# Patient Record
Sex: MALE | Race: BLACK OR AFRICAN AMERICAN | NOT HISPANIC OR LATINO | Employment: UNEMPLOYED | URBAN - METROPOLITAN AREA
[De-identification: names, ages, dates, MRNs, and addresses within clinical notes are randomized per-mention and may not be internally consistent; named-entity substitution may affect disease eponyms.]

---

## 2017-02-21 ENCOUNTER — HISTORICAL (OUTPATIENT)
Dept: RADIOLOGY | Facility: HOSPITAL | Age: 42
End: 2017-02-21

## 2017-10-23 ENCOUNTER — HISTORICAL (OUTPATIENT)
Dept: INTERNAL MEDICINE | Facility: CLINIC | Age: 42
End: 2017-10-23

## 2018-01-08 ENCOUNTER — HISTORICAL (OUTPATIENT)
Dept: ADMINISTRATIVE | Facility: HOSPITAL | Age: 43
End: 2018-01-08

## 2018-01-09 LAB — FINAL CULTURE: NORMAL

## 2018-02-01 ENCOUNTER — HISTORICAL (OUTPATIENT)
Dept: INTERNAL MEDICINE | Facility: CLINIC | Age: 43
End: 2018-02-01

## 2018-02-01 LAB
CHOLEST SERPL-MCNC: 186 MG/DL
CHOLEST/HDLC SERPL: 4.5 {RATIO} (ref 0–5)
EST. AVERAGE GLUCOSE BLD GHB EST-MCNC: 126 MG/DL
HBA1C MFR BLD: 6 % (ref 4.2–6.3)
HDLC SERPL-MCNC: 41 MG/DL
LDLC SERPL CALC-MCNC: 129 MG/DL (ref 0–130)
TRIGL SERPL-MCNC: 80 MG/DL
VLDLC SERPL CALC-MCNC: 16 MG/DL

## 2020-10-15 ENCOUNTER — HISTORICAL (OUTPATIENT)
Dept: ADMINISTRATIVE | Facility: HOSPITAL | Age: 45
End: 2020-10-15

## 2020-10-15 LAB
ABS NEUT (OLG): 5.48 X10(3)/MCL (ref 2.1–9.2)
ALBUMIN SERPL-MCNC: 3.9 GM/DL (ref 3.4–5)
ALBUMIN/GLOB SERPL: 0.9 RATIO (ref 1.1–2)
ALP SERPL-CCNC: 72 UNIT/L (ref 45–117)
ALT SERPL-CCNC: 38 UNIT/L (ref 12–78)
APPEARANCE, UA: CLEAR
AST SERPL-CCNC: 28 UNIT/L (ref 15–37)
BACTERIA #/AREA URNS AUTO: ABNORMAL /HPF
BASOPHILS # BLD AUTO: 0.1 X10(3)/MCL (ref 0–0.2)
BASOPHILS NFR BLD AUTO: 1 %
BILIRUB SERPL-MCNC: 0.4 MG/DL (ref 0.2–1)
BILIRUB UR QL STRIP: NEGATIVE
BILIRUBIN DIRECT+TOT PNL SERPL-MCNC: <0.1 MG/DL (ref 0–0.2)
BILIRUBIN DIRECT+TOT PNL SERPL-MCNC: ABNORMAL MG/DL
BUN SERPL-MCNC: 10 MG/DL (ref 7–18)
CALCIUM SERPL-MCNC: 8.9 MG/DL (ref 8.5–10.1)
CHLORIDE SERPL-SCNC: 107 MMOL/L (ref 98–107)
CHOLEST SERPL-MCNC: 182 MG/DL
CHOLEST/HDLC SERPL: 4.1 {RATIO} (ref 0–5)
CO2 SERPL-SCNC: 29 MMOL/L (ref 21–32)
COLOR UR: YELLOW
CREAT SERPL-MCNC: 1 MG/DL (ref 0.6–1.3)
EOSINOPHIL # BLD AUTO: 0.3 X10(3)/MCL (ref 0–0.9)
EOSINOPHIL NFR BLD AUTO: 4 %
ERYTHROCYTE [DISTWIDTH] IN BLOOD BY AUTOMATED COUNT: 14.3 % (ref 11.5–14.5)
GLOBULIN SER-MCNC: 4.2 GM/ML (ref 2.3–3.5)
GLUCOSE (UA): NEGATIVE
GLUCOSE SERPL-MCNC: 102 MG/DL (ref 74–106)
HCT VFR BLD AUTO: 43.6 % (ref 40–51)
HDLC SERPL-MCNC: 44 MG/DL (ref 40–59)
HGB BLD-MCNC: 13.7 GM/DL (ref 13.5–17.5)
HGB UR QL STRIP: NEGATIVE
HYALINE CASTS #/AREA URNS LPF: ABNORMAL /LPF
IMM GRANULOCYTES # BLD AUTO: 0.04 10*3/UL
IMM GRANULOCYTES NFR BLD AUTO: 0 %
KETONES UR QL STRIP: NEGATIVE
LDLC SERPL CALC-MCNC: 116 MG/DL
LEUKOCYTE ESTERASE UR QL STRIP: NEGATIVE
LYMPHOCYTES # BLD AUTO: 2.7 X10(3)/MCL (ref 0.6–4.6)
LYMPHOCYTES NFR BLD AUTO: 30 %
MCH RBC QN AUTO: 26.3 PG (ref 26–34)
MCHC RBC AUTO-ENTMCNC: 31.4 GM/DL (ref 31–37)
MCV RBC AUTO: 83.7 FL (ref 80–100)
MONOCYTES # BLD AUTO: 0.5 X10(3)/MCL (ref 0.1–1.3)
MONOCYTES NFR BLD AUTO: 6 %
NEUTROPHILS # BLD AUTO: 5.48 X10(3)/MCL (ref 2.1–9.2)
NEUTROPHILS NFR BLD AUTO: 60 %
NITRITE UR QL STRIP: NEGATIVE
PH UR STRIP: 6 [PH] (ref 4.5–8)
PLATELET # BLD AUTO: 407 X10(3)/MCL (ref 130–400)
PMV BLD AUTO: 10.3 FL (ref 7.4–10.4)
POTASSIUM SERPL-SCNC: 3.9 MMOL/L (ref 3.5–5.1)
PROT SERPL-MCNC: 8.1 GM/DL (ref 6.4–8.2)
PROT UR QL STRIP: 20 MG/DL
RBC # BLD AUTO: 5.21 X10(6)/MCL (ref 4.5–5.9)
RBC #/AREA URNS AUTO: ABNORMAL /HPF
SODIUM SERPL-SCNC: 143 MMOL/L (ref 136–145)
SP GR UR STRIP: 1.02 (ref 1–1.03)
SQUAMOUS #/AREA URNS LPF: ABNORMAL /LPF
TRIGL SERPL-MCNC: 110 MG/DL
UROBILINOGEN UR STRIP-ACNC: NORMAL
VLDLC SERPL CALC-MCNC: 22 MG/DL
WBC # SPEC AUTO: 9.2 X10(3)/MCL (ref 4.5–11)
WBC #/AREA URNS AUTO: ABNORMAL /HPF

## 2021-01-13 ENCOUNTER — HISTORICAL (OUTPATIENT)
Dept: RADIOLOGY | Facility: HOSPITAL | Age: 46
End: 2021-01-13

## 2021-01-13 LAB
DEPRECATED CALCIDIOL+CALCIFEROL SERPL-MC: 112.1 NG/ML (ref 30–80)
EST. AVERAGE GLUCOSE BLD GHB EST-MCNC: 122.6 MG/DL
HBA1C MFR BLD: 5.9 %
MAGNESIUM SERPL-MCNC: 1.97 MG/DL (ref 1.6–2.6)
PHOSPHATE SERPL-MCNC: 2.9 MG/DL (ref 2.3–4.7)

## 2021-01-20 ENCOUNTER — HISTORICAL (OUTPATIENT)
Dept: RADIOLOGY | Facility: HOSPITAL | Age: 46
End: 2021-01-20

## 2022-04-11 ENCOUNTER — HISTORICAL (OUTPATIENT)
Dept: ADMINISTRATIVE | Facility: HOSPITAL | Age: 47
End: 2022-04-11

## 2022-04-29 VITALS
HEIGHT: 69 IN | WEIGHT: 267.63 LBS | SYSTOLIC BLOOD PRESSURE: 128 MMHG | BODY MASS INDEX: 39.64 KG/M2 | DIASTOLIC BLOOD PRESSURE: 86 MMHG | OXYGEN SATURATION: 98 %

## 2022-08-18 ENCOUNTER — OFFICE VISIT (OUTPATIENT)
Dept: INTERNAL MEDICINE | Facility: CLINIC | Age: 47
End: 2022-08-18

## 2022-08-18 VITALS
TEMPERATURE: 98 F | RESPIRATION RATE: 18 BRPM | DIASTOLIC BLOOD PRESSURE: 69 MMHG | WEIGHT: 268 LBS | BODY MASS INDEX: 40.62 KG/M2 | OXYGEN SATURATION: 97 % | SYSTOLIC BLOOD PRESSURE: 108 MMHG | HEIGHT: 68 IN | HEART RATE: 74 BPM

## 2022-08-18 DIAGNOSIS — B35.1 FUNGAL TOENAIL INFECTION: ICD-10-CM

## 2022-08-18 DIAGNOSIS — Z86.73 HISTORY OF CEREBROVASCULAR ACCIDENT (CVA) GREATER THAN EIGHT WEEKS IN THE PAST: ICD-10-CM

## 2022-08-18 DIAGNOSIS — D50.9 IRON DEFICIENCY ANEMIA, UNSPECIFIED IRON DEFICIENCY ANEMIA TYPE: ICD-10-CM

## 2022-08-18 DIAGNOSIS — I10 PRIMARY HYPERTENSION: Primary | ICD-10-CM

## 2022-08-18 DIAGNOSIS — G23.1 PSP (PROGRESSIVE SUPRANUCLEAR PALSY): ICD-10-CM

## 2022-08-18 DIAGNOSIS — Z12.12 SCREENING FOR MALIGNANT NEOPLASM OF THE RECTUM: ICD-10-CM

## 2022-08-18 PROCEDURE — 99215 OFFICE O/P EST HI 40 MIN: CPT | Mod: PBBFAC

## 2022-08-18 RX ORDER — PANTOPRAZOLE SODIUM 40 MG/1
40 TABLET, DELAYED RELEASE ORAL DAILY
Qty: 30 TABLET | Refills: 2 | Status: SHIPPED | OUTPATIENT
Start: 2022-08-18 | End: 2023-06-20 | Stop reason: SDUPTHER

## 2022-08-18 RX ORDER — ALBUTEROL SULFATE 0.63 MG/3ML
0.63 SOLUTION RESPIRATORY (INHALATION)
COMMUNITY
Start: 2021-12-09 | End: 2024-03-27

## 2022-08-18 RX ORDER — ROSUVASTATIN CALCIUM 40 MG/1
40 TABLET, COATED ORAL
COMMUNITY
Start: 2021-12-09 | End: 2022-08-18 | Stop reason: ALTCHOICE

## 2022-08-18 RX ORDER — ATORVASTATIN CALCIUM 80 MG/1
80 TABLET, FILM COATED ORAL DAILY
COMMUNITY
Start: 2022-06-03 | End: 2022-08-18 | Stop reason: SDUPTHER

## 2022-08-18 RX ORDER — METOPROLOL TARTRATE 25 MG/1
25 TABLET, FILM COATED ORAL 2 TIMES DAILY
Qty: 30 TABLET | Refills: 2 | Status: SHIPPED | OUTPATIENT
Start: 2022-08-18 | End: 2023-04-03 | Stop reason: SDUPTHER

## 2022-08-18 RX ORDER — GABAPENTIN 600 MG/1
600 TABLET ORAL 3 TIMES DAILY
COMMUNITY
Start: 2022-04-25 | End: 2023-07-17

## 2022-08-18 RX ORDER — AMLODIPINE BESYLATE 2.5 MG/1
2.5 TABLET ORAL DAILY
Qty: 30 TABLET | Refills: 2 | Status: SHIPPED | OUTPATIENT
Start: 2022-08-18 | End: 2023-04-03 | Stop reason: SDUPTHER

## 2022-08-18 RX ORDER — PANTOPRAZOLE SODIUM 40 MG/1
40 TABLET, DELAYED RELEASE ORAL DAILY
Qty: 30 TABLET | Refills: 2 | Status: SHIPPED | OUTPATIENT
Start: 2022-08-18 | End: 2022-08-18 | Stop reason: SDUPTHER

## 2022-08-18 RX ORDER — PAROXETINE 30 MG/1
30 TABLET, FILM COATED ORAL
COMMUNITY
Start: 2021-07-22 | End: 2022-08-18 | Stop reason: SDUPTHER

## 2022-08-18 RX ORDER — ATORVASTATIN CALCIUM 80 MG/1
80 TABLET, FILM COATED ORAL DAILY
Qty: 30 TABLET | Refills: 2 | Status: SHIPPED | OUTPATIENT
Start: 2022-08-18 | End: 2022-12-06 | Stop reason: ALTCHOICE

## 2022-08-18 RX ORDER — PAROXETINE 30 MG/1
30 TABLET, FILM COATED ORAL DAILY
Qty: 30 TABLET | Refills: 2 | Status: SHIPPED | OUTPATIENT
Start: 2022-08-18 | End: 2023-10-08 | Stop reason: SDUPTHER

## 2022-08-18 RX ORDER — METOPROLOL TARTRATE 25 MG/1
25 TABLET, FILM COATED ORAL 2 TIMES DAILY
COMMUNITY
Start: 2022-04-25 | End: 2022-08-18 | Stop reason: SDUPTHER

## 2022-08-18 RX ORDER — NITROGLYCERIN 0.4 MG/1
0.4 TABLET SUBLINGUAL
COMMUNITY
Start: 2021-09-16 | End: 2023-04-03 | Stop reason: SDUPTHER

## 2022-08-18 RX ORDER — PANTOPRAZOLE SODIUM 40 MG/1
40 TABLET, DELAYED RELEASE ORAL DAILY
COMMUNITY
Start: 2022-04-25 | End: 2022-08-18 | Stop reason: SDUPTHER

## 2022-08-18 RX ORDER — AMLODIPINE BESYLATE 2.5 MG/1
2.5 TABLET ORAL DAILY
COMMUNITY
Start: 2022-04-25 | End: 2022-08-18 | Stop reason: SDUPTHER

## 2022-08-18 NOTE — PROGRESS NOTES
[facility]  CLINIC NOTE    Patient Name: John Jackson  YOB: 1975    PRESENTING HISTORY       History of Present Illness:  Mr. John Jackson is a 47 y.o. male  With PMHx: HTN, HLD, CVA (2015 w/ L sided weakness), PFO, chronic low back and neck pain, hx of gastric ulcer    This is a 46 y.o AAM who presents to clinic as a routine f/u. Denies any fever, chills, chest pain, palpitations, nausea, vomiting, abdominal pain, lower extremity swelling, weakness, dizziness, syncope  Patient underwent C5-C7 anterior spine fusion by Dr. Aj on 3/17/21 and was scheduled to have L3-L4 fusion with Dr. Aj on 08/16/2021, but had to cancel due to his wife needing surgery. States that they are planning to reschedule around the first of next year.    He continues to have hx of intermitted low back and neck pain. Still reports gait instability and pain that radiates down his left leg, chronic unchanged. Denies any falls. Patient states he was told per neurosurgery that they would wait to see how he recovers from cervical spine surgery (was told it would be slow healing process) and see if back pain improves any.    Patient was recently seen by cardiology on 3/11/2021 for follow up of PFO.  CHRIS September 18, 2015: No valvular insufficiencies. Patent foramen ovale present with right to left shunt. Lexiscan August 18, 2015: Low risk study, Normal myocardial perfusion study. Negative for ischemia. DSE was performed on 2/2017 which was negative for ischemia. Per last cardiology visit note, plan was to order 30-day Cardiac Event Monitor given intermittent nature of palpitations in the setting of prior CVAs, which was to be done after his neck surgery. Cardiology also discussed potential PFO closure with the pt and plans to discuss further at next visit depending on results of event monitor. Patient reports he received cardiac monitor but was not instructed with how to use or when to start.  Instructed patient to call cardiology office AGAIN today for further instruction.    Depression stable on current dose. No side effects.    At present, he denies chest pain at rest, sob, LOC, n/v/abd pain, melanotic stools or presence of blood in urine, stool, sputum. No fevers or chills. Does endorse chronic chest pain with exertion.     Last Visit: Reports difficulty swallowing solids, no difficulty swallowing liquids. He does endorse intermittent symptoms of pain with swallowing and states the he feels the sensation of food getting stuck in his throat but never vomits. He has also lost 6 ibs since Sept 2021 until now. He reports these symptoms all started a little before August 2021 but this is the first time patient has ever mentioned anything to me. Appetite has lessened some. He denies any abdominal pain, night sweats, fever/chills. He is taking omeprazole 20mg daily which has been on chronically. I ordered lab work for today's visit which he had done in [place] but does not have records sent here yet. Will try to obtain.     Today: He is complaining about a foot drainage in his nail area. See picture in chart. He has continued having having difficulty swallowing. He has continued weakness, balance issues, double vision, pain in neck and pain. He has chronic headaches as well that he used to take amitriptyline.     Suspect PSP, (+) RBD, Mood disorders, Balance issues, Diplopia      CURRENT MEDICATIONS      Current Outpatient Medications on File Prior to Visit   Medication Sig    albuterol (ACCUNEB) 0.63 mg/3 mL Nebu Inhale 0.63 mg into the lungs.    amLODIPine (NORVASC) 2.5 MG tablet Take 2.5 mg by mouth once daily.    gabapentin (NEURONTIN) 600 MG tablet Take 600 mg by mouth 3 (three) times daily.    metoprolol tartrate (LOPRESSOR) 25 MG tablet Take 25 mg by mouth 2 (two) times daily.    nitroGLYCERIN (NITROSTAT) 0.4 MG SL tablet Place 0.4 mg under the tongue.    paroxetine (PAXIL) 30 MG tablet Take  30 mg by mouth.    [DISCONTINUED] atorvastatin (LIPITOR) 80 MG tablet Take 80 mg by mouth once daily.    [DISCONTINUED] pantoprazole (PROTONIX) 40 MG tablet Take 40 mg by mouth once daily.    rosuvastatin (CRESTOR) 40 MG Tab Take 40 mg by mouth.     No current facility-administered medications on file prior to visit.         Review of Systems   HENT: Negative for hearing loss.    Eyes: Negative for discharge.   Respiratory: Negative for wheezing.    Cardiovascular: Negative for chest pain and palpitations.   Gastrointestinal: Positive for constipation. Negative for blood in stool, diarrhea and vomiting.   Genitourinary: Positive for urgency. Negative for hematuria.   Musculoskeletal: Positive for neck pain.   Neurological: Positive for weakness and headaches.   Endo/Heme/Allergies: Positive for polydipsia.       PAST HISTORY:     Past Medical History:   Diagnosis Date    Diabetes mellitus, type 2     Hyperlipidemia     Hypertension        Past Surgical History:   Procedure Laterality Date    CERVICAL FUSION      [facility] 2021    FUSION OF CERVICAL SPINE BY POSTERIOR APPROACH  2017    [facility]       Family History   Problem Relation Age of Onset    No Known Problems Mother     No Known Problems Father        Social History     Socioeconomic History    Marital status: Single   Occupational History    Occupation: dissabled   Tobacco Use    Smoking status: Never Smoker    Smokeless tobacco: Never Used   Substance and Sexual Activity    Alcohol use: Yes     Alcohol/week: 1.0 standard drink     Types: 1 Glasses of wine per week     Comment: glass per night (sm)    Drug use: Yes     Types: Marijuana       Review of patient's allergies indicates:   Allergen Reactions    Aspirin Shortness Of Breath    Iodine Nausea And Vomiting     Seafood.    Shellfish containing products Shortness Of Breath       OBJECTIVE:   Vital Signs:  Vitals:    08/18/22 1444   BP: 108/69   Pulse: 74   Resp: 18   Temp: 98.4  "°F (36.9 °C)   TempSrc: Oral   SpO2: 97%   Weight: 121.6 kg (268 lb)   Height: 5' 8" (1.727 m)       No results found for this or any previous visit (from the past 24 hour(s)).      Physical Exam  Constitutional:       Appearance: He is obese.   HENT:      Head: Normocephalic.   Eyes:      General: Visual field deficit present.      Extraocular Movements: Extraocular movements intact.      Pupils: Pupils are equal, round, and reactive to light.   Cardiovascular:      Rate and Rhythm: Normal rate and regular rhythm.      Pulses: Normal pulses.      Heart sounds: Normal heart sounds.   Pulmonary:      Effort: Pulmonary effort is normal.      Breath sounds: Normal breath sounds.   Musculoskeletal:      Cervical back: Rigidity and tenderness present.   Neurological:      Mental Status: He is alert.      Cranial Nerves: Cranial nerve deficit present.      Motor: Weakness and abnormal muscle tone present. No tremor.      Coordination: Romberg sign positive. Finger-Nose-Finger Test abnormal.      Comments: Movement Exam:    Hypophonic, Hypomimia  No postural or rest tremors  SQJs (+), Vertical Pendular Oscillations, Delay in downward Gaze  FTN without tremor, difficulty performing with diplopia   Hypokinesia in Bilateral Upper extremities on finger taps, decrement more apparent on right   Increased tone, not necessarily cogwheeling  (+) Pull Test   (+) Rombergs         Laboratory  CMP:   Recent Labs   Lab 10/15/20  1026 03/15/21  0907   Sodium Level 143 142   Potassium Level 3.9 4.5   Chloride 107 107   Carbon Dioxide 29 27   Blood Urea Nitrogen 10 7.3 L   Creatinine 1.00 0.90   Glucose Level 102 102 H   Calcium Level Total 8.9 8.7   Albumin Level 3.9  --    Bilirubin Total 0.4  --    Bilirubin Direct <0.1  --    Bilirubin Indirect Calc. not valid  --    Aspartate Aminotransferase 28  --    Alanine Aminotransferase 38  --    Alkaline Phosphatase 72  --      CBC:   Recent Labs   Lab 10/15/20  1026 03/15/21  0907   WBC 9.2  " --    Neut # 5.48  --    RBC 5.21  --    Hgb 13.7 13.7 L   Hct 43.6 44.5   Platelet 407 H  --    MCV 83.7  --    RDW 14.3  --      FLP:   Recent Labs   Lab 10/15/20  1026   Cholesterol Total 182   HDL Cholesterol 44   LDL Cholesterol 116   Triglyceride 110     DM:   Recent Labs   Lab 10/15/20  1026 01/13/21  1007 03/15/21  0907   Hemoglobin A1c  --  5.9  --    Creatinine 1.00  --  0.90     Thyroid:       Invalid input(s): [id number]  Anemia:   Recent Labs   Lab 03/15/21  0907   Hgb 13.7 L   Hct 44.5       ASSESSMENT & PLAN:         Health Maintenance Due   Topic Date Due    Hepatitis C Screening  Never done    COVID-19 Vaccine (1) Never done    HIV Screening  Never done    TETANUS VACCINE  Never done    Colorectal Cancer Screening  Never done      Cervical spine stenosis s/p C5-7 Cervical Fusion Anterior on 3/17/21  - MRI lumbar spine in April 2015 showed diffuse spondylitic changes with mild diffuse narrowing of neural foramina, multilevel disc bulging  - Status post neck surgery in November 2015 at [facility]  - Most recently underwent C5-C7 spine fusion by Dr. Aj, scheduled to undergo L3-L4 fusion with Dr. Aj on 08/16/2021 which was cancelled and being rescheduled for next year  - Currently Baclofen prescribed per neurosurgery  - Refilled gabapentin today for neuropathy    Hx of PFO/ chronic chest pain on exertion  - PFO with right to left shunt seen on CHRIS in Sept 2015  - DSE was performed on 2/2017 which was negative for ischemia  - 48-Hour Holter Monitor (1/13/21): No significant arrhythmias found  - TTE (1/13/21): EF 55%  - DSE (1/13/21): Negative for ischemia  - Followed by [facility] cardiology last seen on 03/2022, planning to discuss PFO closure at his next follow up visit pending results of Holter monitor  - Given prn nitroglycerin per cardiology - states he has not required use, has followed up with Cardiology 8/31/2022    Dysphagia -> Suspected PSP  - RBD, Balance issues, Pull test (+),  Hypophonia, Square wave jerks, Vertical Pendular Oscillations (slow)  -Referral to Neurology  -see HPI above  -ordered Barium swallow test at last visit - pt given number to call to schedule testing  -continue protonix 40mg daily    History of CVA with residual left-sided weakness  - Stable, no new deficits; uses walker and/or cane at home for ambulation.  - Continue aspirin 81mg daily  - LDL increased to now 150, will stop rosuvastatin and start atorvastatin 80mg daily  - repeat FLP at next visit    Pre-diabetes  - A1C is 5.7% in 2020 increased to 5.9% in 2021 --> decreased now to 5.6%  - will obtain A1c today, if not increased significantly will stop metformin    Essential HTN, well-controlled  - BP today at goal  - Continue current medications Norvasc 2.5mg daily and metoprolol 25mg BID  - Low-sodium diet and exercise discussed    Depression, stable  - will continue home med paroxetine 30 mg daily    Painful skin lesions/ onychomycosis  s/p skin tag removal  - pt reports painful horn on left temple, irritating skin tags on face and painful keloid overlying C-spine incision scar  - thickened b/l great toenails present for past year with underlying debris, can consider PO treatment in future will hold on on starting new medication before he undergoes back surgery  - seen by Derm clinic for skin tag removal in 2021  - Recommended [employer] medicated powder in the interim.    Asthma  -report hx of childhood asthma  -reports no recent exacerbations  -continues to use albuterol inhaler for symptoms of LEW on daily basis  -reorder PFTs at last visit as patient did not have done before today and order          Health Maintenance:  TDAP: 10/15/2020  CAGE screenin/4  HIV screening: ordered at last visit  Acute hep panel and syphilis Ab screening ordered at last visit  Influenza vaccine: flu given today  Tdap: given 10/15/2020  colon cancer screening: Will order FIT test  AAA - never smoker  COVID -  UTD  PCV 23 - Given 5/2022      Future Appointments     Future Appointments   Date Time Provider Department Center   8/31/2022  8:30 AM Althea Nichole MD [facility]IK[facility] Jaskaran [facility]      Orders Placed This Encounter   Procedures    OCCULT BLOOD FECAL IMMUNOASSAY     Standing Status:   Future     Number of Occurrences:   1     Standing Expiration Date:   10/17/2023    Ambulatory referral/consult to Podiatry     Standing Status:   Future     Standing Expiration Date:   9/18/2023     Referral Priority:   Routine     Referral Type:   Consultation     Referral Reason:   Specialty Services Required     Referred to Provider:   Narcisa Mckenzie DPM     Requested Specialty:   Podiatry     Number of Visits Requested:   1         Discussed with Dr. Rowley  - Staff Attestation to Follow    Tu Ramirez MD  [facility] Internal [facility] PGY-1

## 2022-08-24 ENCOUNTER — TELEPHONE (OUTPATIENT)
Dept: INTERNAL MEDICINE | Facility: CLINIC | Age: 47
End: 2022-08-24

## 2022-08-24 NOTE — TELEPHONE ENCOUNTER
----- Message from Tu Ramirez MD sent at 8/22/2022  8:30 AM CDT -----  Please inform Mr. Jackson, the following labs were unremarkable.

## 2022-08-31 ENCOUNTER — TELEPHONE (OUTPATIENT)
Dept: INTERNAL MEDICINE | Facility: CLINIC | Age: 47
End: 2022-08-31

## 2022-08-31 ENCOUNTER — OFFICE VISIT (OUTPATIENT)
Dept: CARDIOLOGY | Facility: CLINIC | Age: 47
End: 2022-08-31

## 2022-08-31 VITALS
OXYGEN SATURATION: 96 % | SYSTOLIC BLOOD PRESSURE: 113 MMHG | HEIGHT: 69 IN | DIASTOLIC BLOOD PRESSURE: 73 MMHG | TEMPERATURE: 99 F | HEART RATE: 64 BPM | BODY MASS INDEX: 39.34 KG/M2 | WEIGHT: 265.63 LBS | RESPIRATION RATE: 24 BRPM

## 2022-08-31 DIAGNOSIS — J45.21 MILD INTERMITTENT ASTHMA WITH ACUTE EXACERBATION: Primary | ICD-10-CM

## 2022-08-31 DIAGNOSIS — R00.2 PALPITATIONS: Primary | ICD-10-CM

## 2022-08-31 DIAGNOSIS — E78.5 HYPERLIPIDEMIA LDL GOAL <70: ICD-10-CM

## 2022-08-31 DIAGNOSIS — I10 HYPERTENSION, UNSPECIFIED TYPE: ICD-10-CM

## 2022-08-31 PROCEDURE — 99214 OFFICE O/P EST MOD 30 MIN: CPT | Mod: PBBFAC | Performed by: INTERNAL MEDICINE

## 2022-08-31 RX ORDER — ASPIRIN 81 MG/1
81 TABLET ORAL DAILY
Refills: 0
Start: 2022-08-31 | End: 2023-02-10 | Stop reason: SDUPTHER

## 2022-08-31 RX ORDER — EZETIMIBE 10 MG/1
10 TABLET ORAL DAILY
Qty: 90 TABLET | Refills: 3 | Status: SHIPPED | OUTPATIENT
Start: 2022-08-31 | End: 2023-04-03 | Stop reason: SDUPTHER

## 2022-08-31 RX ORDER — ALBUTEROL SULFATE 90 UG/1
2 AEROSOL, METERED RESPIRATORY (INHALATION) EVERY 6 HOURS PRN
Qty: 0.103 G | Refills: 3 | Status: SHIPPED | OUTPATIENT
Start: 2022-08-31 | End: 2023-06-20 | Stop reason: SDUPTHER

## 2022-08-31 RX ORDER — BUDESONIDE AND FORMOTEROL FUMARATE DIHYDRATE 160; 4.5 UG/1; UG/1
2 AEROSOL RESPIRATORY (INHALATION) EVERY 12 HOURS
Qty: 10.2 G | Refills: 1 | Status: SHIPPED | OUTPATIENT
Start: 2022-08-31 | End: 2023-06-20 | Stop reason: SDUPTHER

## 2022-08-31 NOTE — TELEPHONE ENCOUNTER
Patient requesting rx. For an Albuterol inhaler for Asthma  diagnosis. Patient states he uses it as a rescue inhaler. Please review and advise

## 2022-08-31 NOTE — TELEPHONE ENCOUNTER
Spoke with patient.    Will prescribe symbicort at this time, as well as renew his controller.    Tu Ramirez MD  [facility] Internal Medicine PGY-1

## 2022-08-31 NOTE — PROGRESS NOTES
Chief Complaint   Patient presents with    f/u visit, denies chest pains, c/o sob on exertion    Palpitations    Dizziness       HPI    47 year old male with HTN, HLD, diet controlled DM, ?CVA x 2 (no brain MRI at the time), PFO, asthma presents for follow-up and ongoing care. He reported having a coronary angiogram with Dr. Nate Mae in the past in which he was told he did not have any significant blockage. Patient completed a 48-hour Holter monitor in January 2021 in which no significant arrhythmias were found. He completed an echocardiogram in January 2021 which revealed an ejection fraction of 55%. Dobutamine stress echocardiogram completed in January 2021 was negative for ischemia.     Today, pt continues to complain of palpitations that occur at random.  They usually last  5- 10 minutes. Palpitations are a/w SOB and dizziness. Sometimes experiences sharp shooting chest pain that lasts for seconds.  Denies any other exertional chest pain.  He complains chronic dyspnea due to his asthma. He did undergo neck surgery in March 2021 and states he will require lower back surgery in the near future.  He is currently only on rescue inhaler that he has been taking very frequently.  No bleeding on aspirin.      48-Hour Holter Monitor (1/13/21): No significant arrhythmias found  TTE (1/13/21): EF 55%  DSE (1/13/21): Negative for ischemia  CHRIS (9/18/15): PFO w/ Rt to Lt shunt       Review of Systems  Constitutional: negative for fever,chills, sweats, weakness, fatigue, decreased activity   Eye: negative for blurry vision, vision change  ENMT: negative for sore throat, nasal congestion  Respiratory: negative for shortness of breath, cough, wheezing  Cardiovascular: negative for chest pain, dyspnea on exertion, orthopnea, PND, lower extremity edema, palpitations, claudication  Gastrointestinal: negative for nausea, vomiting, abdominal pain, constipation, diarrhea, blood in stool  Genitourinary: negative for hematuria,  nocturia, dysuria  Endocrine: negative for abnormal thirst, temperature  Musculoskeletal: negative for back pain, joint pain  Integumentary: negative for abnormal mole  Neurologic: negative for weakness, numbness, headaches, shooting pain  Hematology: negative for easy bruising, easy bleeding  Allergy: negative for watery eyes, sneezing  Psychiatric: negative for loss of interest, anxiety, stress      Physical Exam Vitals & Measurements     Vitals:    08/31/22 0847   BP: 113/73   Pulse: 64   Resp: (!) 24   Temp: 98.6 °F (37 °C)       General: alert and oriented/no acute distress  Eye: EOMI/normal conjunctiva  HENT: normocephalic/moist oral mucosa  Neck: supple/nontender/no carotid bruit/no JVD  Respiratory: lungs CTA/nonlabored respirations/BS equal/symmetrical expansion/no chest wall tenderness  Cardiovascular: normal rate/normal rhythm/no murmur/normal peripheral perfusion/no edema  Gastrointestinal: soft/nontender  Musculoskeletal: normal ROM  Integumentary: warm/dry/pink/intact  Neurologic: alert/oriented/normal sensory/no focal deficits  Psychiatric: cooperative/appropriate mood and affect/normal judgment       Current Outpatient Medications:     albuterol (ACCUNEB) 0.63 mg/3 mL Nebu, Inhale 0.63 mg into the lungs., Disp: , Rfl:     amLODIPine (NORVASC) 2.5 MG tablet, Take 1 tablet (2.5 mg total) by mouth once daily., Disp: 30 tablet, Rfl: 2    atorvastatin (LIPITOR) 80 MG tablet, Take 1 tablet (80 mg total) by mouth once daily., Disp: 30 tablet, Rfl: 2    gabapentin (NEURONTIN) 600 MG tablet, Take 600 mg by mouth 3 (three) times daily., Disp: , Rfl:     metoprolol tartrate (LOPRESSOR) 25 MG tablet, Take 1 tablet (25 mg total) by mouth 2 (two) times daily., Disp: 30 tablet, Rfl: 2    nitroGLYCERIN (NITROSTAT) 0.4 MG SL tablet, Place 0.4 mg under the tongue., Disp: , Rfl:     pantoprazole (PROTONIX) 40 MG tablet, Take 1 tablet (40 mg total) by mouth once daily., Disp: 30 tablet, Rfl: 2    paroxetine (PAXIL) 30  MG tablet, Take 1 tablet (30 mg total) by mouth once daily., Disp: 30 tablet, Rfl: 2        Assessment/Plan    Palpitations - continues to endorse palpitations at random - will reorder 30 day monitor     HTN - BP at goal - 113/73    HLD - lipid panel not at goal, LDL 94. Will add zetia 10mg daily     Possible hx of CVA x2, no brain MRI done at the time of the stroke. Pt found to have PFO on CHRIS in 2015. Pt will need to be seen my neurology for possible brain MRI to determine if neurologic symptoms were consistent with stroke. If there is evidence of prior stroke, pt will benefit from referral for PFO closure.  continue Aspirin, statin    DM -diet controlled. A1c 5.6%    Asthma  Currently only on albuterol PRN which he has been using frequently  Will reach out to PCP to request addition of maintenance inhaler.     30 day monitor  Zetia 10mg daily  3 month F/U

## 2022-09-07 ENCOUNTER — HOSPITAL ENCOUNTER (OUTPATIENT)
Dept: CARDIOLOGY | Facility: HOSPITAL | Age: 47
Discharge: HOME | End: 2022-09-07
Attending: INTERNAL MEDICINE
Payer: MEDICAID

## 2022-09-07 DIAGNOSIS — R00.2 PALPITATIONS: ICD-10-CM

## 2022-09-07 PROCEDURE — 93270 REMOTE 30 DAY ECG REV/REPORT: CPT

## 2022-11-03 NOTE — PROGRESS NOTES
[facility] Neurology Initial Office Visit Note    Initial Visit Date: 11/4/2022  Current Visit Date:  11/04/2022    Chief Complaint:     Chief Complaint   Patient presents with    News PAtient referral-complains of weakness both upper nadl       History of Present Illness:      This is 47 y.o. male with history of hypertension, hyperlipidemia, CAD, anxiety, depression, C3-4 cervical myelopathy status post ACDF, who is referred for gait instability. He states that he continued to have paresthesia and weakness in bilateral upper and lower extremities prior to his ACDF, but he has not gotten better since then. He states that his paresthesia has progressed since then.  Also reports involuntary in closure of left hand and big toe going upwards.  Also stated that he had trouble with urination. He had not gotten a repeat MRI ordered by NSGY this year. + Neck pain. Had not had PT/OT for > 1 year.  Also states that his vision will be blurry.  States that he has trouble seeing side to side.  Has not actually seen an eye doctor.      Medications:     Current Outpatient Medications on File Prior to Visit   Medication Sig Dispense Refill    albuterol (ACCUNEB) 0.63 mg/3 mL Nebu Inhale 0.63 mg into the lungs.      albuterol (VENTOLIN HFA) 90 mcg/actuation inhaler Inhale 2 puffs into the lungs every 6 (six) hours as needed for Wheezing. Rescue 0.103 g 3    amLODIPine (NORVASC) 2.5 MG tablet Take 1 tablet (2.5 mg total) by mouth once daily. 30 tablet 2    aspirin (ECOTRIN) 81 MG EC tablet Take 1 tablet (81 mg total) by mouth once daily.  0    atorvastatin (LIPITOR) 80 MG tablet Take 1 tablet (80 mg total) by mouth once daily. 30 tablet 2    budesonide-formoterol 160-4.5 mcg (SYMBICORT) 160-4.5 mcg/actuation HFAA Inhale 2 puffs into the lungs every 12 (twelve) hours. Controller 10.2 g 1    ezetimibe (ZETIA) 10 mg tablet Take 1 tablet (10 mg total) by mouth once daily. 90 tablet 3    gabapentin (NEURONTIN) 600 MG tablet Take 600 mg by  mouth 3 (three) times daily.      metoprolol tartrate (LOPRESSOR) 25 MG tablet Take 1 tablet (25 mg total) by mouth 2 (two) times daily. 30 tablet 2    nitroGLYCERIN (NITROSTAT) 0.4 MG SL tablet Place 0.4 mg under the tongue.      pantoprazole (PROTONIX) 40 MG tablet Take 1 tablet (40 mg total) by mouth once daily. 30 tablet 2    paroxetine (PAXIL) 30 MG tablet Take 1 tablet (30 mg total) by mouth once daily. 30 tablet 2     No current facility-administered medications on file prior to visit.       Labs:     Results for orders placed or performed in visit on 08/18/22   Hemoglobin A1C   Result Value Ref Range    Hemoglobin A1c 5.6 <=7.0 %    Estimated Average Glucose 114.0 mg/dL   Lipid Panel   Result Value Ref Range    Cholesterol Total 161 <=200 mg/dL    HDL Cholesterol 41 35 - 60 mg/dL    Triglyceride 129 34 - 140 mg/dL    Cholesterol/HDL Ratio 4 0 - 5    Very Low Density Lipoprotein 26     LDL Cholesterol 94.00 50.00 - 140.00 mg/dL   Comprehensive Metabolic Panel   Result Value Ref Range    Sodium Level 140 136 - 145 mmol/L    Potassium Level 4.3 3.5 - 5.1 mmol/L    Chloride 103 98 - 107 mmol/L    Carbon Dioxide 29 22 - 29 mmol/L    Glucose Level 87 74 - 100 mg/dL    Blood Urea Nitrogen 12.9 8.9 - 20.6 mg/dL    Creatinine 1.19 (H) 0.73 - 1.18 mg/dL    Calcium Level Total 9.6 8.4 - 10.2 mg/dL    Protein Total 8.1 6.4 - 8.3 gm/dL    Albumin Level 4.1 3.5 - 5.0 gm/dL    Globulin 4.0 (H) 2.4 - 3.5 gm/dL    Albumin/Globulin Ratio 1.0 (L) 1.1 - 2.0 ratio    Bilirubin Total 0.5 <=1.5 mg/dL    Alkaline Phosphatase 73 40 - 150 unit/L    Alanine Aminotransferase 30 0 - 55 unit/L    Aspartate Aminotransferase 25 5 - 34 unit/L    eGFR >60 mls/min/1.73/m2   HIV 1/2 Ag/Ab (4th Gen)   Result Value Ref Range    HIV Nonreactive Nonreactive   Hepatitis Panel, Acute   Result Value Ref Range    Hepatitis A IgM Nonreactive Nonreactive    Hepatitis B Core IgM Nonreactive Nonreactive    Hepatitis B Surface Antigen Nonreactive  Nonreactive    Hepatitis C Antibody Nonreactive Nonreactive   Ferritin   Result Value Ref Range    Ferritin Level 242.61 21.81 - 274.66 ng/mL   Iron and TIBC   Result Value Ref Range    Iron Binding Capacity Unsaturated 229 69 - 240 ug/dL    Iron Level 75 65 - 175 ug/dL    Transferrin 267 174 - 364 mg/dL    Iron Binding Capacity Total 304 250 - 450 ug/dL    Iron Saturation 25 20 - 50 %   Pathologist Interpretation   Result Value Ref Range    Pathology Review       No serological evidence of recent or past hepatitis A, B, or C infection.  Jean Carlos Huggins MD         Studies:      MRI C spine w/o Fortino 1/20/2021:  I have reviewed the study independently and with the patient.  Hardware artifact.  Cord atrophy noted at C3 to C5 consistent with prior cord compression.    Review of Systems:     Review of Systems   All other systems reviewed and are negative.    Physical Exams:     Vitals:    11/04/22 0918   BP: 116/79   Pulse: 90   Resp: 14   Temp: 98 °F (36.7 °C)       Physical Exam  Vitals and nursing note reviewed.   Constitutional:       Appearance: Normal appearance.   HENT:      Head: Normocephalic and atraumatic.      Nose: Nose normal.      Mouth/Throat:      Mouth: Mucous membranes are moist.      Pharynx: Oropharynx is clear.   Eyes:      Conjunctiva/sclera: Conjunctivae normal.   Cardiovascular:      Rate and Rhythm: Normal rate and regular rhythm.      Pulses: Normal pulses.      Heart sounds: Normal heart sounds.   Pulmonary:      Effort: Pulmonary effort is normal.      Breath sounds: Normal breath sounds.   Abdominal:      General: Abdomen is flat.      Palpations: Abdomen is soft.   Musculoskeletal:      Cervical back: Normal range of motion.      Comments: Limited passive and active range of motion in neck   Neurological:      Mental Status: He is alert.   Psychiatric:         Mood and Affect: Mood normal.       Comprehensive Neurological Exam:  Mental Status: Alert Oriented to Self, Date, and Place.  Comprehension wnl. No dysarthria.   CN II - XII: SHAWN, No APD, Fundus wnl OU, VFFC, No ptosis OU, EOMI without nystagmus on observation, poor VOR in all directions due to limited range of motion in neck,  vertical and horizontal saccades normal on OKN strip, LT/Temp symmetric in CN V1-3 distribution, Hearing grossly intact, Face Symmetric, Tongue and Uvula midline, Trapezius symmetric bilateral.   Motor: tone increased in right upper and bilateral lower extremities, and bulk wnl throughout, no abnormal involuntary or voluntary movements, 5/5 to confrontation except for 4/5 in left deltoid, left triceps, left intrinsic hand, left thumb extension, left iliopsoas, left EHL, left TA, left peroneal longus, 4+/5 left tibialis posterior, Fine finger movements wnl b/l, left downward pronator drift.   Sensory: LT, Proprioception, Vibration, PP, Temp decreased in left upper and left lower  Reflexes: 3+ throughout except for left brachioradialis, left biceps, + left finger flexion and grasp,  tight heel cords bilaterally, upgoing toes bilateral   Cerebellar: FNF wnl b/l, RAHM wnl b/l  Romberg:  Negative  Gait:  Bilateral dystonic big toe dorsiflexion.    Assessment:     This is 47 y.o. male with history of hypertension, hyperlipidemia, CAD, anxiety, depression, diabetes, C3-4 cervical myelopathy status post ACDF, who is referred for cervical myelopathy with radiculopathy with residual dystonia and carpopedal spasm.  Eye exam not up-to-date.  No evidence to suggest eye motility issues.  May consider lowering gabapentinoids and avoid concurrent use of gabapentinoids and SSRI given subjective complaint of delay reflexes.    Problem List Items Addressed This Visit          Neuro    Cervical myelopathy with cervical radiculopathy - Primary       Plan:     [] Continue follow-up with neurosurgery  [] Patient highly advised to follow-up with ophthalmologist or optometry for up-to-date eye exam given history of diabetes and  hypertension.    RTC p.r.n.    I have explained the treatment plan, diagnosis, and prognosis to patient. All questions are answered to the best of my knowledge.     Face to face time 60 minutes, including documentation, chart review, counseling, education, review of test results, relevant medical records, and coordination of care.   I have explained the treatment plan, diagnosis, and prognosis to patient. All questions are answered to the best of my knowledge.       Carlota Kan MD   General Neurology  11/04/2022

## 2022-11-04 ENCOUNTER — OFFICE VISIT (OUTPATIENT)
Dept: NEUROLOGY | Facility: CLINIC | Age: 47
End: 2022-11-04
Payer: MEDICAID

## 2022-11-04 VITALS
HEART RATE: 90 BPM | HEIGHT: 69 IN | TEMPERATURE: 98 F | DIASTOLIC BLOOD PRESSURE: 79 MMHG | WEIGHT: 274.5 LBS | BODY MASS INDEX: 40.66 KG/M2 | RESPIRATION RATE: 14 BRPM | OXYGEN SATURATION: 97 % | SYSTOLIC BLOOD PRESSURE: 116 MMHG

## 2022-11-04 DIAGNOSIS — M54.12 CERVICAL MYELOPATHY WITH CERVICAL RADICULOPATHY: Primary | ICD-10-CM

## 2022-11-04 DIAGNOSIS — G95.9 CERVICAL MYELOPATHY WITH CERVICAL RADICULOPATHY: Primary | ICD-10-CM

## 2022-11-04 PROBLEM — G23.1 PSP (PROGRESSIVE SUPRANUCLEAR PALSY): Status: RESOLVED | Noted: 2022-08-18 | Resolved: 2022-11-04

## 2022-11-04 PROCEDURE — 99205 OFFICE O/P NEW HI 60 MIN: CPT | Mod: S$PBB,,, | Performed by: PSYCHIATRY & NEUROLOGY

## 2022-11-04 PROCEDURE — 99205 PR OFFICE/OUTPT VISIT, NEW, LEVL V, 60-74 MIN: ICD-10-PCS | Mod: S$PBB,,, | Performed by: PSYCHIATRY & NEUROLOGY

## 2022-11-04 PROCEDURE — 99215 OFFICE O/P EST HI 40 MIN: CPT | Mod: PBBFAC | Performed by: PSYCHIATRY & NEUROLOGY

## 2022-12-01 ENCOUNTER — LAB VISIT (OUTPATIENT)
Dept: LAB | Facility: HOSPITAL | Age: 47
End: 2022-12-01
Attending: STUDENT IN AN ORGANIZED HEALTH CARE EDUCATION/TRAINING PROGRAM
Payer: MEDICAID

## 2022-12-01 ENCOUNTER — TELEPHONE (OUTPATIENT)
Dept: CARDIOLOGY | Facility: CLINIC | Age: 47
End: 2022-12-01

## 2022-12-01 ENCOUNTER — OFFICE VISIT (OUTPATIENT)
Dept: CARDIOLOGY | Facility: CLINIC | Age: 47
End: 2022-12-01
Payer: MEDICAID

## 2022-12-01 VITALS
WEIGHT: 273.81 LBS | SYSTOLIC BLOOD PRESSURE: 120 MMHG | HEART RATE: 58 BPM | DIASTOLIC BLOOD PRESSURE: 82 MMHG | BODY MASS INDEX: 40.56 KG/M2 | TEMPERATURE: 98 F | HEIGHT: 69 IN | RESPIRATION RATE: 20 BRPM | OXYGEN SATURATION: 99 %

## 2022-12-01 DIAGNOSIS — R00.2 PALPITATIONS: Primary | ICD-10-CM

## 2022-12-01 DIAGNOSIS — R06.00 DYSPNEA, UNSPECIFIED TYPE: ICD-10-CM

## 2022-12-01 DIAGNOSIS — E78.5 HYPERLIPIDEMIA LDL GOAL <70: ICD-10-CM

## 2022-12-01 DIAGNOSIS — R29.818 SUSPECTED SLEEP APNEA: ICD-10-CM

## 2022-12-01 DIAGNOSIS — I10 HYPERTENSION, UNSPECIFIED TYPE: ICD-10-CM

## 2022-12-01 LAB
CHOLEST SERPL-MCNC: 219 MG/DL
CHOLEST/HDLC SERPL: 5 {RATIO} (ref 0–5)
HDLC SERPL-MCNC: 45 MG/DL (ref 35–60)
LDLC SERPL CALC-MCNC: 160 MG/DL (ref 50–140)
TRIGL SERPL-MCNC: 71 MG/DL (ref 34–140)
VLDLC SERPL CALC-MCNC: 14 MG/DL

## 2022-12-01 PROCEDURE — 36415 COLL VENOUS BLD VENIPUNCTURE: CPT

## 2022-12-01 PROCEDURE — 80061 LIPID PANEL: CPT

## 2022-12-01 NOTE — PROGRESS NOTES
Cardiology Clinic      CHIEF COMPLAINT:   Chief Complaint   Patient presents with    f/u sts has chest pain off/on with sob                    Review of patient's allergies indicates:   Allergen Reactions    Iodine Nausea And Vomiting     Seafood.    Shellfish containing products Shortness Of Breath                                          HPI:  John Jackson 47 y.o. male HTN, HLD, diet controlled DM, ?CVA x 2 (no brain MRI at the time), PFO, asthma presents for follow-up and ongoing care. He reported having a coronary angiogram with Dr. Nate Mae in the past in which he was told he did not have any significant blockage. Patient completed a 48-hour Holter monitor in January 2021 in which no significant arrhythmias were found. Had a 30-day event monitor in October that did not show any arrhythmias. He completed an echocardiogram in January 2021 which revealed an ejection fraction of 55%. Dobutamine stress echocardiogram completed in January 2021 was negative for ischemia.                                                                                                                    Mr. Jackson says he continues to have palpitation that vary in frequency (sometimes goes a week or two without palptiations but sometimes has palptiations intermitently throughout the day). They last a few seconds at a time. He mentions BLE edema, intermittent and LEW. Does have asthma. Has sharp/pressure chest pain intermittent that also varries in frequency, similar to his palptiations (sometimes goes weeks without any CP), and when he does have CP it lasts a few minutes. Has baseline, chronic orthopnea with 8 pillows.          Patient Active Problem List   Diagnosis    Fungal toenail infection    Palpitations    Hypertension    Hyperlipidemia LDL goal <70    Cervical myelopathy with cervical radiculopathy     Past Surgical History:   Procedure Laterality Date    CERVICAL FUSION      [facility] 2021    CHOLECYSTECTOMY       FUSION OF CERVICAL SPINE BY POSTERIOR APPROACH  2017    [facility]     Social History     Socioeconomic History    Marital status: Single   Occupational History    Occupation: dissabled   Tobacco Use    Smoking status: Never    Smokeless tobacco: Never   Substance and Sexual Activity    Alcohol use: Yes     Alcohol/week: 1.0 standard drink     Types: 1 Glasses of wine per week     Comment: glass per night (sm)    Drug use: Yes     Types: Marijuana        Family History   Problem Relation Age of Onset    No Known Problems Mother     No Known Problems Father          Current Outpatient Medications:     albuterol (ACCUNEB) 0.63 mg/3 mL Nebu, Inhale 0.63 mg into the lungs., Disp: , Rfl:     albuterol (VENTOLIN HFA) 90 mcg/actuation inhaler, Inhale 2 puffs into the lungs every 6 (six) hours as needed for Wheezing. Rescue, Disp: 0.103 g, Rfl: 3    amLODIPine (NORVASC) 2.5 MG tablet, Take 1 tablet (2.5 mg total) by mouth once daily., Disp: 30 tablet, Rfl: 2    aspirin (ECOTRIN) 81 MG EC tablet, Take 1 tablet (81 mg total) by mouth once daily., Disp: , Rfl: 0    atorvastatin (LIPITOR) 80 MG tablet, Take 1 tablet (80 mg total) by mouth once daily., Disp: 30 tablet, Rfl: 2    budesonide-formoterol 160-4.5 mcg (SYMBICORT) 160-4.5 mcg/actuation HFAA, Inhale 2 puffs into the lungs every 12 (twelve) hours. Controller, Disp: 10.2 g, Rfl: 1    ezetimibe (ZETIA) 10 mg tablet, Take 1 tablet (10 mg total) by mouth once daily., Disp: 90 tablet, Rfl: 3    gabapentin (NEURONTIN) 600 MG tablet, Take 600 mg by mouth 3 (three) times daily., Disp: , Rfl:     metoprolol tartrate (LOPRESSOR) 25 MG tablet, Take 1 tablet (25 mg total) by mouth 2 (two) times daily., Disp: 30 tablet, Rfl: 2    nitroGLYCERIN (NITROSTAT) 0.4 MG SL tablet, Place 0.4 mg under the tongue., Disp: , Rfl:     pantoprazole (PROTONIX) 40 MG tablet, Take 1 tablet (40 mg total) by mouth once daily., Disp: 30 tablet, Rfl: 2    paroxetine (PAXIL) 30 MG tablet, Take 1 tablet  "(30 mg total) by mouth once daily., Disp: 30 tablet, Rfl: 2     ROS:                                                                                                                                                                             ROS   As above    Blood pressure 120/82, pulse (!) 58, temperature 97.7 °F (36.5 °C), temperature source Oral, resp. rate 20, height 5' 9.29" (1.76 m), weight 124.2 kg (273 lb 12.8 oz), SpO2 99 %.   PE:  Physical Exam   General - Appears comfortable, appropriatley conversive   Mental Status - alert and oriented x 3, speaking in logical, relevant sentences   HEENT - no rhinorrhea   Cardiac - RRR, no murmurs, rubs, or gallops; no edema in LE   Respiratory - breathing comfortably; clear to ascultation bilaterally   Abdominal - nondistended, soft, nontender to palpation   Extremities - LE, UE, and joints are nonerythematous and nonswollen   Skin - no rashes or bruises seen on skin          ASSESSMENT/PLAN:    Palpitations   30-day event monitor is normal     HTN - BP at goal - 113/73     HLD - lipid panel not at goal, LDL 94 at time of last visit when zetia 10mg was added. Continue Zetia, atorvastatin 80mg daily. Will check lipid panel.       LEW, BLE edema  -ordering updated TTE    Left-Sided Spacticity  -there was concern for possible stroke in 2015 b/c of left sided weakness but MRI Brain was never performed. Pt found to have PFO on CHRIS in 2015, so there was a question as to whether he would benefit from PFO closure. I discussed with Neurology who clarified that patient's left-sided symptoms are due to spasticity from a cervical cord compression in 2015 and is not due to a stroke. So we will not refer him for PFO closure. At next visit we can re-evaluate his LDL goal and need for ASA.  continue Aspirin, statin    DM -diet controlled. A1c 5.6%     Suspected Sleep Apnea  -ordering sleep study. He has daytime fatigue despite sleeping 8 hours    Asthma  Currently on Symbicort  Follows " with PCP     Follow up in 4 months    Joe Morales PGY 3

## 2022-12-01 NOTE — TELEPHONE ENCOUNTER
Adan Kan, this is Kennedy Morales, I'm messaging you from Cardiology clinic. Mr. Jackson had a possible CVA with symptoms of LUE and LLE weakness, first documented in [place] in 3/2015 in a IM clinic note. An MRI was never done, but he was found to have a PFO on a CHRIS in 2015. We think he may benefit from PFO closure if there is a strong suspicion for a CVA. We're hoping to see if you can evaluate him to see how convincing his history is for a CVA and/or to see if a brain MRI would be helpful. He's already established with Neuro clinic with a PRN follow up; could you please evaluate him for this possible CVA? Thanks.

## 2022-12-01 NOTE — PROGRESS NOTES
Cardiology attending addendum  Patient was seen and examined with Dr. Joe Morales. Agree with plan as outlined above.     Althea Nichole MD  Cardiology-[facility]

## 2022-12-06 DIAGNOSIS — E78.5 HYPERLIPIDEMIA LDL GOAL <70: Primary | ICD-10-CM

## 2022-12-06 RX ORDER — ROSUVASTATIN CALCIUM 40 MG/1
40 TABLET, COATED ORAL NIGHTLY
Qty: 90 TABLET | Refills: 3 | Status: SHIPPED | OUTPATIENT
Start: 2022-12-06 | End: 2023-04-03 | Stop reason: SDUPTHER

## 2022-12-06 NOTE — TELEPHONE ENCOUNTER
Spoke with pt and he is agreeable to stopping Lipitor, continuing Zetia 10 mg oral daily, starting Crestor 40 mg oral daily, and obtaining CMP/FLP in 6 weeks. Orders proposed to YOSVANY Pierce.    ----- Message from LESLYE Griffiths sent at 12/5/2022  4:01 PM CST -----  Regarding: Re:  Lipid panel  Please verify with patient if whether or not he is compliant with Lipitor 80 and Zetia 10 mg p.o. daily as his LDL is above goal 160.  If patient does report compliance, I would like to switch Lipitor to Crestor 40 with repeat CMP/FLP 6 weeks after switching to Crestor.  Please instruct patient that he will need to continue Zetia 10 mg p.o. daily.  Also instructed on low-fat/cholesterol diet and exercise as tolerated.  Thanks so much.

## 2023-01-03 ENCOUNTER — OFFICE VISIT (OUTPATIENT)
Dept: INTERNAL MEDICINE | Facility: CLINIC | Age: 48
End: 2023-01-03
Payer: MEDICAID

## 2023-01-03 VITALS
SYSTOLIC BLOOD PRESSURE: 118 MMHG | RESPIRATION RATE: 18 BRPM | TEMPERATURE: 98 F | BODY MASS INDEX: 40.14 KG/M2 | HEART RATE: 70 BPM | DIASTOLIC BLOOD PRESSURE: 82 MMHG | WEIGHT: 271 LBS | HEIGHT: 69 IN | OXYGEN SATURATION: 99 %

## 2023-01-03 DIAGNOSIS — Z86.73 HISTORY OF CEREBROVASCULAR ACCIDENT (CVA) GREATER THAN EIGHT WEEKS IN THE PAST: ICD-10-CM

## 2023-01-03 DIAGNOSIS — Z12.11 SPECIAL SCREENING FOR MALIGNANT NEOPLASMS, COLON: Primary | ICD-10-CM

## 2023-01-03 DIAGNOSIS — R73.03 PRE-DIABETES: ICD-10-CM

## 2023-01-03 DIAGNOSIS — I10 PRIMARY HYPERTENSION: ICD-10-CM

## 2023-01-03 DIAGNOSIS — J45.31 MILD PERSISTENT ASTHMA WITH ACUTE EXACERBATION: ICD-10-CM

## 2023-01-03 DIAGNOSIS — R11.10 REGURGITATION OF FOOD: ICD-10-CM

## 2023-01-03 PROCEDURE — 99215 OFFICE O/P EST HI 40 MIN: CPT | Mod: PBBFAC

## 2023-01-03 RX ORDER — CETIRIZINE HYDROCHLORIDE 5 MG/1
5 TABLET ORAL DAILY
Qty: 30 TABLET | Refills: 11 | Status: SHIPPED | OUTPATIENT
Start: 2023-01-03 | End: 2023-09-10 | Stop reason: SDUPTHER

## 2023-01-03 RX ORDER — PREDNISONE 20 MG/1
20 TABLET ORAL 2 TIMES DAILY PRN
Qty: 20 TABLET | Refills: 0 | Status: SHIPPED | OUTPATIENT
Start: 2023-01-03 | End: 2023-01-13

## 2023-01-03 RX ORDER — FLUTICASONE PROPIONATE 50 MCG
2 SPRAY, SUSPENSION (ML) NASAL DAILY
Qty: 16 ML | Refills: 0 | Status: SHIPPED | OUTPATIENT
Start: 2023-01-03 | End: 2023-02-22

## 2023-01-03 NOTE — PROGRESS NOTES
INTERNAL MEDICINE RESIDENT CLINIC  CLINIC NOTE    Patient Name: John Jackson  YOB: 1975    PRESENTING HISTORY       History of Present Illness:  Mr. John Jackson is a 47 y.o. male  With PMHx: HTN, HLD, CVA (2015 w/ L sided weakness), PFO, chronic low back and neck pain, hx of gastric ulcer    This is a 46 y.o AAM who presents to clinic as a routine f/u. Denies any fever, chills, chest pain, palpitations, nausea, vomiting, abdominal pain, lower extremity swelling, weakness, dizziness, syncope Patient underwent C5-C7 anterior spine fusion by Dr. Aj on 3/17/21 and was scheduled to have L3-L4 fusion with Dr. Aj on 08/16/2021, but had to cancel due to his wife needing surgery.    Last Visit: Reports difficulty swallowing solids, no difficulty swallowing liquids. He does endorse intermittent symptoms of pain with swallowing and states the he feels the sensation of food getting stuck in his throat but never vomits. He has also lost 6 ibs since Sept 2021 until now. He reports these symptoms all started a little before August 2021 but this is the first time patient has ever mentioned anything to me. Appetite has lessened some. He denies any abdominal pain, night sweats, fever/chills. He is taking omeprazole 20mg daily which has been on chronically. I ordered lab work for today's visit which he had done in [place] but does not have records sent here yet. Will try to obtain.     Today: Was seen by Neurology, symptoms likely due to cervical myelopathy with carpopedal spasms and dystonic toes. Spasticity not due to stroke. PFO closure not necessary at this time, and was seen by Cardiology. LDL not at goal this time. Patient today complains of congestion, itchy throat, some mild SOB no wheezing. He states it has been going on for three days. Additionally still has dysphagia complaints, needs fluids to figueroa down his food otherwise solids comeback up. Not necessarily vomiting.          CURRENT  MEDICATIONS      Current Outpatient Medications on File Prior to Visit   Medication Sig    albuterol (ACCUNEB) 0.63 mg/3 mL Nebu Inhale 0.63 mg into the lungs.    albuterol (VENTOLIN HFA) 90 mcg/actuation inhaler Inhale 2 puffs into the lungs every 6 (six) hours as needed for Wheezing. Rescue    amLODIPine (NORVASC) 2.5 MG tablet Take 1 tablet (2.5 mg total) by mouth once daily.    aspirin (ECOTRIN) 81 MG EC tablet Take 1 tablet (81 mg total) by mouth once daily.    budesonide-formoterol 160-4.5 mcg (SYMBICORT) 160-4.5 mcg/actuation HFAA Inhale 2 puffs into the lungs every 12 (twelve) hours. Controller    ezetimibe (ZETIA) 10 mg tablet Take 1 tablet (10 mg total) by mouth once daily.    gabapentin (NEURONTIN) 600 MG tablet Take 600 mg by mouth 3 (three) times daily.    metoprolol tartrate (LOPRESSOR) 25 MG tablet Take 1 tablet (25 mg total) by mouth 2 (two) times daily.    nitroGLYCERIN (NITROSTAT) 0.4 MG SL tablet Place 0.4 mg under the tongue.    pantoprazole (PROTONIX) 40 MG tablet Take 1 tablet (40 mg total) by mouth once daily.    paroxetine (PAXIL) 30 MG tablet Take 1 tablet (30 mg total) by mouth once daily.    rosuvastatin (CRESTOR) 40 MG Tab Take 1 tablet (40 mg total) by mouth every evening.     No current facility-administered medications on file prior to visit.         Review of Systems   HENT:  Negative for hearing loss.    Eyes:  Negative for discharge.   Respiratory:  Negative for wheezing.    Cardiovascular:  Negative for chest pain and palpitations.   Gastrointestinal:  Positive for constipation. Negative for blood in stool, diarrhea and vomiting.   Genitourinary:  Positive for urgency. Negative for hematuria.   Musculoskeletal:  Positive for neck pain.   Neurological:  Positive for weakness and headaches.   Endo/Heme/Allergies:  Positive for polydipsia.       PAST HISTORY:     Past Medical History:   Diagnosis Date    Diabetes mellitus, type 2     Hyperlipidemia     Hypertension        Past  "Surgical History:   Procedure Laterality Date    CERVICAL FUSION      [facility] 2021    CHOLECYSTECTOMY      FUSION OF CERVICAL SPINE BY POSTERIOR APPROACH  2017    [place]       Family History   Problem Relation Age of Onset    No Known Problems Mother     No Known Problems Father        Social History     Socioeconomic History    Marital status: Single   Occupational History    Occupation: dissabled   Tobacco Use    Smoking status: Never    Smokeless tobacco: Never   Substance and Sexual Activity    Alcohol use: Yes     Alcohol/week: 1.0 standard drink     Types: 1 Glasses of wine per week     Comment: glass per night (sm)    Drug use: Yes     Types: Marijuana       Review of patient's allergies indicates:   Allergen Reactions    Iodine Nausea And Vomiting     Seafood.    Shellfish containing products Shortness Of Breath       OBJECTIVE:   Vital Signs:  Vitals:    01/03/23 0813   BP: 118/82   Pulse: 70   Resp: 18   Temp: 97.9 °F (36.6 °C)   TempSrc: Oral   SpO2: 99%   Weight: 122.9 kg (271 lb)   Height: 5' 9" (1.753 m)       No results found for this or any previous visit (from the past 24 hour(s)).      Physical Exam  Constitutional:       Appearance: He is obese.   HENT:      Head: Normocephalic.      Nose: Congestion and rhinorrhea present.   Eyes:      General: Visual field deficit present.      Extraocular Movements: Extraocular movements intact.      Pupils: Pupils are equal, round, and reactive to light.   Cardiovascular:      Rate and Rhythm: Normal rate and regular rhythm.      Pulses: Normal pulses.      Heart sounds: Normal heart sounds.   Pulmonary:      Effort: Pulmonary effort is normal.      Breath sounds: Normal breath sounds.   Musculoskeletal:      Cervical back: Rigidity and tenderness present.   Neurological:      Mental Status: He is alert.      Cranial Nerves: Cranial nerve deficit present.      Motor: Weakness and abnormal muscle tone present. No tremor.      Coordination: Romberg " sign positive. Finger-Nose-Finger Test abnormal.      Comments: Left Hand strength 3+/5 vs 5/5 RH    Movement Exam:    Hypophonic, Hypomimia  No postural or rest tremors  EOM slow, not   FTN without tremor  Hypokinesia in Bilateral Upper extremities on finger taps, decrement more apparent on right, left hypokinetic  Increased tone on left, spastic     Psychiatric:         Mood and Affect: Mood normal.         Behavior: Behavior normal.       Laboratory  CMP:   Recent Labs   Lab 10/15/20  1026 03/15/21  0907 08/18/22  1607   Sodium Level 143 142 140   Potassium Level 3.9 4.5 4.3   Chloride 107 107 103   Carbon Dioxide 29 27 29   Blood Urea Nitrogen 10 7.3 L 12.9   Creatinine 1.00 0.90 1.19 H   Glucose Level 102 102 H 87   Calcium Level Total 8.9 8.7 9.6   Albumin Level 3.9  --  4.1   Bilirubin Total 0.4  --  0.5   Bilirubin Direct <0.1  --   --    Bilirubin Indirect Calc. not valid  --   --    Aspartate Aminotransferase 28  --  25   Alanine Aminotransferase 38  --  30   Alkaline Phosphatase 72  --  73       CBC:   Recent Labs   Lab 10/15/20  1026 03/15/21  0907   WBC 9.2  --    Neut # 5.48  --    RBC 5.21  --    Hgb 13.7 13.7 L   Hct 43.6 44.5   Platelet 407 H 390   MCV 83.7  --    RDW 14.3  --        FLP:   Recent Labs   Lab 10/15/20  1026 08/18/22  1607 12/01/22  1104   Cholesterol Total 182 161 219 H   HDL Cholesterol 44 41 45   LDL Cholesterol 116 94.00 160.00 H   Triglyceride 110 129 71       DM:   Recent Labs   Lab 10/15/20  1026 01/13/21  1007 03/15/21  0907 08/18/22  1607   Hemoglobin A1c  --  5.9  --  5.6   Creatinine 1.00  --  0.90 1.19 H       Thyroid:       Invalid input(s): [id number]  Anemia:   Recent Labs   Lab 03/15/21  0907 08/18/22  1607   Hgb 13.7 L  --    Hct 44.5  --    Iron Level  --  75   Ferritin Level  --  242.61   Transferrin  --  267   Iron Binding Capacity Total  --  304         ASSESSMENT & PLAN:         Health Maintenance Due   Topic Date Due    Colorectal Cancer Screening  Never done     COVID-19 Vaccine (4 - Booster for Moderna series) 01/10/2023      Cervical spine stenosis s/p C5-7 Cervical Fusion Anterior on 3/17/21  - MRI lumbar spine in April 2015 showed diffuse spondylitic changes with mild diffuse narrowing of neural foramina, multilevel disc bulging  - Status post neck surgery in November 2015 at [facility]  - Most recently underwent C5-C7 spine fusion by Dr. Aj, scheduled to undergo L3-L4 fusion with Dr. Aj on 08/16/2021 which was cancelled and being rescheduled for next year, advised to schedule follow up  - Currently Baclofen prescribed per neurosurgery, not taking  - Refilled gabapentin today for neuropathy    Hx of PFO/ chronic chest pain on exertion  - PFO with right to left shunt seen on CHRIS in Sept 2015  - DSE was performed on 2/2017 which was negative for ischemia  - 48-Hour Holter Monitor (1/13/21): No significant arrhythmias found  - TTE (1/13/21): EF 55%, repeat pending  - DSE (1/13/21): Negative for ischemia  - Followed by [facility] cardiology last seen on 12/2022, planning    Dysphagia -> Describes regurgitation of solid food if not follow by liquids  - Neurology note 11/2022 - cervical myelopathy with radiculopathy with residual dystonia and carpopedal spasm  - reordered Barium swallow test   - continue protonix 40mg daily    History of CVA with residual left-sided weakness, as reported  - Stable, no new deficits; uses walker and/or cane at home for ambulation.  - Would like to get MRI to quantify extent of lesions, will hold off for now  - Continue aspirin 81mg daily  - LDL increased to now 160, currently on Zettia and Rosuvastatin max dose    Pre-diabetes  - A1C is 5.7% in Feb 2020 increased to 5.9% in Jan 2021 --> decreased now to 5.6%  - will obtain A1c today, if not increased significantly will stop metformin    Essential HTN, well-controlled  - BP today at goal  - Continue current medications Norvasc 2.5mg daily and metoprolol 25mg BID  - Low-sodium diet and  exercise discussed    Depression, stable  - will continue home med paroxetine 30 mg daily    Asthma, Mild Persistent  -report hx of childhood asthma  -Prescribed symbicort 160-4.5m patient is compliant  -States he has 4x per month needs albuterol rescue inhaler, sometimes wakes him up at night  -reorder PFTs previously, advised to follow up  - Ordered prednisone 20mg BID PRN 10 day supply         Health Maintenance:  TDAP: 10/15/2020  CAGE screenin  HIV screening: ordered at last visit  Acute hep panel and syphilis Ab screening ordered at last visit  Influenza vaccine: flu given today  Tdap: given 10/15/2020  colon cancer screening: Will order Cologuard, advised patient to follow up on this  AAA - never smoker  COVID - UTD  PCV 23 - Given 2022      Future Appointments     Future Appointments   Date Time Provider Department Center   3/27/2023  7:00 AM [facility] ECHO  [facility] ECHO [facility]   4/3/2023  9:30 AM Althea Nichole MD [facility] CARD [facility]      No orders of the defined types were placed in this encounter.        Discussed with Dr. Momin - Staff Attestation to Follow    Tu Ramirez MD  [facility] Internal Medicine PGY-1

## 2023-01-09 ENCOUNTER — HOSPITAL ENCOUNTER (OUTPATIENT)
Dept: RADIOLOGY | Facility: HOSPITAL | Age: 48
Discharge: HOME | End: 2023-01-09
Payer: MEDICAID

## 2023-01-09 DIAGNOSIS — R11.10 REGURGITATION OF FOOD: ICD-10-CM

## 2023-01-09 PROCEDURE — 74220 X-RAY XM ESOPHAGUS 1CNTRST: CPT | Mod: TC

## 2023-02-10 DIAGNOSIS — I63.9 CEREBROVASCULAR ACCIDENT (CVA), UNSPECIFIED MECHANISM: Primary | ICD-10-CM

## 2023-02-10 RX ORDER — ASPIRIN 81 MG/1
81 TABLET ORAL DAILY
Qty: 90 TABLET | Refills: 3
Start: 2023-02-10 | End: 2023-05-23 | Stop reason: SDUPTHER

## 2023-03-27 ENCOUNTER — HOSPITAL ENCOUNTER (OUTPATIENT)
Dept: CARDIOLOGY | Facility: HOSPITAL | Age: 48
Discharge: HOME | End: 2023-03-27
Attending: STUDENT IN AN ORGANIZED HEALTH CARE EDUCATION/TRAINING PROGRAM
Payer: MEDICAID

## 2023-03-27 VITALS
DIASTOLIC BLOOD PRESSURE: 82 MMHG | HEIGHT: 69 IN | BODY MASS INDEX: 40.14 KG/M2 | SYSTOLIC BLOOD PRESSURE: 126 MMHG | WEIGHT: 271 LBS

## 2023-03-27 DIAGNOSIS — R06.00 DYSPNEA, UNSPECIFIED TYPE: ICD-10-CM

## 2023-03-27 LAB
AV INDEX (PROSTH): 0.69
AV MEAN GRADIENT: 6 MMHG
AV PEAK GRADIENT: 9 MMHG
AV VALVE AREA: 2.11 CM2
AV VELOCITY RATIO: 0.64
BSA FOR ECHO PROCEDURE: 2.45 M2
CV ECHO LV RWT: 0.37 CM
DOP CALC AO PEAK VEL: 1.54 M/S
DOP CALC AO VTI: 32.4 CM
DOP CALC LVOT AREA: 3 CM2
DOP CALC LVOT DIAMETER: 1.97 CM
DOP CALC LVOT PEAK VEL: 0.98 M/S
DOP CALC LVOT STROKE VOLUME: 68.24 CM3
DOP CALC MV VTI: 31.8 CM
DOP CALCLVOT PEAK VEL VTI: 22.4 CM
E WAVE DECELERATION TIME: 197.14 MSEC
E/A RATIO: 2.75
ECHO LV POSTERIOR WALL: 0.93 CM (ref 0.6–1.1)
EJECTION FRACTION: 60 %
FRACTIONAL SHORTENING: 41 % (ref 28–44)
HR MV ECHO: 59 BPM
INTERVENTRICULAR SEPTUM: 1.04 CM (ref 0.6–1.1)
LEFT ATRIUM SIZE: 4.05 CM
LEFT INTERNAL DIMENSION IN SYSTOLE: 3.01 CM (ref 2.1–4)
LEFT VENTRICLE DIASTOLIC VOLUME INDEX: 52.52 ML/M2
LEFT VENTRICLE DIASTOLIC VOLUME: 123.43 ML
LEFT VENTRICLE MASS INDEX: 78 G/M2
LEFT VENTRICLE SYSTOLIC VOLUME INDEX: 15 ML/M2
LEFT VENTRICLE SYSTOLIC VOLUME: 35.28 ML
LEFT VENTRICULAR INTERNAL DIMENSION IN DIASTOLE: 5.09 CM (ref 3.5–6)
LEFT VENTRICULAR MASS: 183.66 G
LV LATERAL E/E' RATIO: 5.5 M/S
LVOT MG: 2.03 MMHG
LVOT MV: 0.66 CM/S
MV MEAN GRADIENT: 2 MMHG
MV PEAK A VEL: 0.36 M/S
MV PEAK E VEL: 0.99 M/S
MV PEAK GRADIENT: 6 MMHG
MV VALVE AREA BY CONTINUITY EQUATION: 2.15 CM2
PISA MRMAX VEL: 4.64 M/S
PISA TR MAX VEL: 2.49 M/S
RA WIDTH: 3.7 CM
TDI LATERAL: 0.18 M/S
TR MAX PG: 25 MMHG
TRICUSPID ANNULAR PLANE SYSTOLIC EXCURSION: 1.99 CM

## 2023-03-27 PROCEDURE — 93306 TTE W/DOPPLER COMPLETE: CPT

## 2023-04-03 ENCOUNTER — OFFICE VISIT (OUTPATIENT)
Dept: CARDIOLOGY | Facility: CLINIC | Age: 48
End: 2023-04-03
Payer: MEDICAID

## 2023-04-03 VITALS
OXYGEN SATURATION: 98 % | RESPIRATION RATE: 18 BRPM | DIASTOLIC BLOOD PRESSURE: 78 MMHG | SYSTOLIC BLOOD PRESSURE: 127 MMHG | BODY MASS INDEX: 40.11 KG/M2 | HEIGHT: 69 IN | TEMPERATURE: 99 F | HEART RATE: 62 BPM | WEIGHT: 270.81 LBS

## 2023-04-03 DIAGNOSIS — E78.5 HYPERLIPIDEMIA, UNSPECIFIED HYPERLIPIDEMIA TYPE: ICD-10-CM

## 2023-04-03 DIAGNOSIS — R07.9 CHEST PAIN, UNSPECIFIED TYPE: Primary | ICD-10-CM

## 2023-04-03 DIAGNOSIS — I10 HYPERTENSION, UNSPECIFIED TYPE: ICD-10-CM

## 2023-04-03 DIAGNOSIS — R00.2 PALPITATIONS: ICD-10-CM

## 2023-04-03 DIAGNOSIS — R60.9 EDEMA, UNSPECIFIED TYPE: ICD-10-CM

## 2023-04-03 DIAGNOSIS — R06.02 SOB (SHORTNESS OF BREATH): ICD-10-CM

## 2023-04-03 DIAGNOSIS — E78.5 HYPERLIPIDEMIA LDL GOAL <70: ICD-10-CM

## 2023-04-03 PROCEDURE — 99214 OFFICE O/P EST MOD 30 MIN: CPT | Mod: PBBFAC | Performed by: STUDENT IN AN ORGANIZED HEALTH CARE EDUCATION/TRAINING PROGRAM

## 2023-04-03 RX ORDER — AMLODIPINE BESYLATE 2.5 MG/1
2.5 TABLET ORAL DAILY
Qty: 30 TABLET | Refills: 2 | Status: SHIPPED | OUTPATIENT
Start: 2023-04-03 | End: 2023-09-11 | Stop reason: SDUPTHER

## 2023-04-03 RX ORDER — EVOLOCUMAB 140 MG/ML
140 INJECTION, SOLUTION SUBCUTANEOUS
Qty: 7.36 ML | Refills: 2 | Status: SHIPPED | OUTPATIENT
Start: 2023-04-03 | End: 2023-07-17 | Stop reason: SDUPTHER

## 2023-04-03 RX ORDER — AMLODIPINE BESYLATE 2.5 MG/1
2.5 TABLET ORAL DAILY
Qty: 30 TABLET | Refills: 2 | Status: SHIPPED | OUTPATIENT
Start: 2023-04-03 | End: 2023-04-03 | Stop reason: SDUPTHER

## 2023-04-03 RX ORDER — EZETIMIBE 10 MG/1
10 TABLET ORAL DAILY
Qty: 90 TABLET | Refills: 3 | Status: SHIPPED | OUTPATIENT
Start: 2023-04-03 | End: 2023-10-08 | Stop reason: SDUPTHER

## 2023-04-03 RX ORDER — NITROGLYCERIN 0.4 MG/1
0.4 TABLET SUBLINGUAL EVERY 5 MIN PRN
Qty: 25 TABLET | Refills: 3 | Status: SHIPPED | OUTPATIENT
Start: 2023-04-03 | End: 2024-03-27 | Stop reason: SDUPTHER

## 2023-04-03 RX ORDER — METOPROLOL TARTRATE 25 MG/1
25 TABLET, FILM COATED ORAL 2 TIMES DAILY
Qty: 30 TABLET | Refills: 2 | Status: SHIPPED | OUTPATIENT
Start: 2023-04-03 | End: 2023-09-11 | Stop reason: SDUPTHER

## 2023-04-03 RX ORDER — METOPROLOL TARTRATE 25 MG/1
25 TABLET, FILM COATED ORAL 2 TIMES DAILY
Qty: 30 TABLET | Refills: 2 | Status: SHIPPED | OUTPATIENT
Start: 2023-04-03 | End: 2023-04-03 | Stop reason: SDUPTHER

## 2023-04-03 RX ORDER — ROSUVASTATIN CALCIUM 40 MG/1
40 TABLET, COATED ORAL NIGHTLY
Qty: 90 TABLET | Refills: 3 | Status: SHIPPED | OUTPATIENT
Start: 2023-04-03 | End: 2023-10-08 | Stop reason: SDUPTHER

## 2023-04-03 NOTE — PROGRESS NOTES
[facility]   Cardiology Clinic - Follow Up     Date of Visit: 4/3/2023  Reason for Visit/Chief Complaint:   Chief Complaint    Follow-up          I have explained to Mr. John Jackson that I am not a cardiologist. He understands that I am an internal medicine physician seeing patients in the cardiology clinic. Mr. John Jackson has expressed understanding of this fact and is willing to proceed with this visit.       History of Present Illness:      John Jackson is a 47 y.o. male with a PMH significant for HTN, HLD, diet controlled DM, ?CVA x 2 (no brain MRI at the time), PFO, asthma who presents to cardiology clinic for follow up. He reported having a coronary angiogram with Dr. Nate Mae in the past in which he was told he did not have any significant blockage. Patient completed a 48-hour Holter monitor in January 2021 in which no significant arrhythmias were found. Had a 30-day event monitor in October 2022 that did not show any arrhythmias. He completed an echocardiogram in January 2021 which revealed an ejection fraction of 55%. Dobutamine stress echocardiogram completed in January 2021 was negative for ischemia.     Today he presents with complaints of CP that occurs at least twice a week. CP is sharp and radiates to his neck. It is associated with SOB and diaphoresis. We discussed proceeding with stress test and he is willing to do so. His partner was with him today and reports he has had significant edema on his legs, arms and abdomen. She reports he had a lot of fluid in his abdomen and she gave him one of her spironolactone pills and he had impressive diuresis. I explained that sharing medications was not wise and could cause harm. They expressed understanding.  He is currently euvolemic on exam.     CP:  The patient has no chest discomfort.      SOB:  The patient has shortness of breath. Has LEW    EDEMA:  The patient has edema.      ORTHOPNEA:  The patient has  orthopnea.  No PND.      SYNCOPE:  The patient denies near syncope.  No syncope.   Has dizziness.    PALPITATIONS:  The patient has palpitations.    LEVEL OF EXERTION:  The patient does house work and has symptoms with this level of exertion.  The patient's level of exertion is minimal.    Past Medical History:        Past Medical History:   Diagnosis Date    Diabetes mellitus, type 2     Hyperlipidemia     Hypertension        Surgical History:        Past Surgical History:   Procedure Laterality Date    CERVICAL FUSION      [facility] [address] 2021    CHOLECYSTECTOMY      FUSION OF CERVICAL SPINE BY POSTERIOR APPROACH  [address] [place] [address]       Family History:        Family History   Problem Relation Age of Onset    No Known Problems Mother     No Known Problems Father        Social History:        Social History     Tobacco Use    Smoking status: Never    Smokeless tobacco: Never   Substance Use Topics    Alcohol use: Yes     Alcohol/week: 1.0 standard drink     Types: 1 Glasses of wine per week     Comment: glass per night (sm)    Drug use: Yes     Types: Marijuana       Allergies:         Review of patient's allergies indicates:   Allergen Reactions    Iodine Nausea And Vomiting     Seafood.    Shellfish containing products Shortness Of Breath       Medications:        Current Outpatient Medications   Medication Sig Dispense Refill    albuterol (ACCUNEB) 0.63 mg/3 mL Nebu Inhale 0.63 mg into the lungs.      albuterol (VENTOLIN HFA) 90 mcg/actuation inhaler Inhale 2 puffs into the lungs every 6 (six) hours as needed for Wheezing. Rescue 0.103 g 3    amLODIPine (NORVASC) 2.5 MG tablet Take 1 tablet (2.5 mg total) by mouth once daily. 30 tablet 2    aspirin (ECOTRIN) 81 MG EC tablet Take 1 tablet (81 mg total) by mouth once daily. 90 tablet 3    budesonide-formoterol 160-4.5 mcg (SYMBICORT) 160-4.5 mcg/actuation HFAA Inhale 2 puffs into the lungs every 12 (twelve) hours. Controller 10.2 g 1    cetirizine (ZYRTEC) 5 MG  tablet Take 1 tablet (5 mg total) by mouth once daily. 30 tablet 11    ezetimibe (ZETIA) 10 mg tablet Take 1 tablet (10 mg total) by mouth once daily. 90 tablet 3    gabapentin (NEURONTIN) 600 MG tablet Take 600 mg by mouth 3 (three) times daily.      metoprolol tartrate (LOPRESSOR) 25 MG tablet Take 1 tablet (25 mg total) by mouth 2 (two) times daily. 30 tablet 2    nitroGLYCERIN (NITROSTAT) 0.4 MG SL tablet Place 1 tablet (0.4 mg total) under the tongue every 5 (five) minutes as needed for Chest pain. 25 tablet 3    pantoprazole (PROTONIX) 40 MG tablet Take 1 tablet (40 mg total) by mouth once daily. 30 tablet 2    paroxetine (PAXIL) 30 MG tablet Take 1 tablet (30 mg total) by mouth once daily. 30 tablet 2    rosuvastatin (CRESTOR) 40 MG Tab Take 1 tablet (40 mg total) by mouth every evening. 90 tablet 3     No current facility-administered medications for this visit.       I have reviewed and updated the patient's medications, allergies, past medical history, surgical history, social history and family history as needed.    Review of Systems:      Review of Systems   Constitutional:  Positive for chills and diaphoresis. Negative for fever.   HENT:  Negative for hearing loss and nosebleeds.    Eyes:  Negative for blurred vision.   Respiratory:  Positive for shortness of breath.    Cardiovascular:  Positive for chest pain, palpitations and orthopnea. Negative for claudication and PND.   Gastrointestinal:  Positive for vomiting. Negative for nausea.   Genitourinary:  Negative for dysuria.   Musculoskeletal:  Negative for myalgias.   Skin:  Negative for rash.   Neurological:  Negative for dizziness and headaches.     Objective:        Vitals:    04/03/23 0925   BP: 127/78   Pulse: 62   Resp: 18   Temp: 98.6 °F (37 °C)     Wt Readings from Last 3 Encounters:   04/03/23 122.8 kg (270 lb 12.8 oz)   03/27/23 122.9 kg (271 lb)   01/03/23 122.9 kg (271 lb)     Temp Readings from Last 3 Encounters:   04/03/23 98.6 °F (37  "°C)   01/03/23 97.9 °F (36.6 °C) (Oral)   12/01/22 97.7 °F (36.5 °C) (Oral)     BP Readings from Last 3 Encounters:   04/03/23 127/78   03/27/23 126/82   01/03/23 118/82     Pulse Readings from Last 3 Encounters:   04/03/23 62   01/03/23 70   12/01/22 (!) 58       Vitals:    04/03/23 0925   BP: 127/78   BP Location: Left arm   Patient Position: Sitting   BP Method: Medium (Automatic)   Pulse: 62   Resp: 18   Temp: 98.6 °F (37 °C)   SpO2: 98%   Weight: 122.8 kg (270 lb 12.8 oz)   Height: 5' 9" (1.753 m)     Body mass index is 39.99 kg/m².    Physical Exam  Constitutional:       Appearance: Normal appearance.   HENT:      Head: Normocephalic and atraumatic.   Eyes:      Conjunctiva/sclera: Conjunctivae normal.   Neck:      Vascular: No carotid bruit.   Cardiovascular:      Rate and Rhythm: Normal rate and regular rhythm.      Pulses: Normal pulses.      Heart sounds: Normal heart sounds.   Pulmonary:      Effort: Pulmonary effort is normal.      Breath sounds: Normal breath sounds.   Abdominal:      General: Bowel sounds are normal.      Palpations: Abdomen is soft.   Musculoskeletal:      Right lower leg: No edema.      Left lower leg: No edema.   Skin:     General: Skin is warm and dry.      Findings: No rash.   Neurological:      General: No focal deficit present.      Mental Status: He is alert.   Psychiatric:         Mood and Affect: Mood normal.         Thought Content: Thought content normal.         Judgment: Judgment normal.        Labs:      I have reviewed the following labs below:      CBC:  Lab Results   Component Value Date    WBC 8.5 04/03/2023    HGB 14.3 04/03/2023    HCT 45.2 04/03/2023     04/03/2023    MCV 82.2 04/03/2023    RDW 13.9 04/03/2023     BMP:  Lab Results   Component Value Date     04/03/2023    K 4.7 04/03/2023    CO2 28 04/03/2023    BUN 13.2 04/03/2023    CALCIUM 10.0 04/03/2023    MG 1.97 01/13/2021    PHOS 2.9 01/13/2021     LFTs:  Lab Results   Component Value Date    " ALBUMIN 4.0 04/03/2023    BILITOT 0.4 04/03/2023    AST 24 04/03/2023    ALKPHOS 64 04/03/2023    ALT 29 04/03/2023     FLP:  Cholesterol Total   Date Value Ref Range Status   04/03/2023 224 (H) <=200 mg/dL Final     HDL Cholesterol   Date Value Ref Range Status   04/03/2023 43 35 - 60 mg/dL Final     LDL Cholesterol   Date Value Ref Range Status   04/03/2023 153.00 (H) 50.00 - 140.00 mg/dL Final     Triglyceride   Date Value Ref Range Status   04/03/2023 138 34 - 140 mg/dL Final     DM:  Lab Results   Component Value Date    HGBA1C 5.8 04/03/2023    HGBA1C 5.6 08/18/2022    HGBA1C 5.9 01/13/2021    CREATININE 1.27 (H) 04/03/2023     Anemia:  Lab Results   Component Value Date    IRON 75 08/18/2022    TIBC 304 08/18/2022    FERRITIN 242.61 08/18/2022     Coags:  Lab Results   Component Value Date    INR 1.0 03/15/2021    PROTIME 13.1 03/15/2021      Cardiac:No results found for: TROPONINI, BNP, CKTOTAL, CKMB, CKMM, CKBB    Cardiac Studies/Imaging:        I have reviewed the following studies below:      Echocardiogram  Results for orders placed during the hospital encounter of 03/27/23    Echo    Interpretation Summary  · Normal right ventricular size with normal right ventricular systolic function.  · Mild pulmonic regurgitation.  · Mild mitral regurgitation.  · The left ventricle is normal in size with normal systolic function.  · The estimated ejection fraction is 60%.  · Normal left ventricular diastolic function.  · There is no pulmonary hypertension.          Assessment & Plan:      47 y.o. male with the following medical problems:    Chest Pain  Stress test ordered  Rx given for SL nitro  Ed precautions given     Edema  Echo was WNL  Jad non-cardiac  Recommend further evaluation by PCP     HTN - BP at goal - 127/78  continnorvasc     HLD    - not at goal   Continue Crestor and Zetia  Will send Rx for Repatha     Left-Sided Spacticity  -there was concern for possible stroke in 2015 b/c of left sided  weakness but MRI Brain was never performed. Pt found to have PFO on CHRIS in 2015, so there was a question as to whether he would benefit from PFO closure.Case discussed with Neurology who clarified that patient's left-sided symptoms are due to spasticity from a cervical cord compression in 2015 and is not due to a stroke. So we will not refer him for PFO closure. At next visit we can re-evaluate his LDL goal and need for ASA.  continue Aspirin, statin    DM -diet controlled. A1c 5.6%     Suspected Sleep Apnea  Sleep study ordered at previous visit  Counseled on importance of proceeding with sleep study     Return to clinic in 4 months.      Future Appointments   Date Time Provider Department Center   4/24/2023  9:00 AM RESIDENT 18, [facility] INTERNAL MEDICINE [facility] DENAE Dexter   7/17/2023  9:30 AM Althea Nichole MD [facility] RITIKA Guzman DO  Internal Medicine Physician   Department of Medicine   [facility]    04/03/2023 9:19 AM

## 2023-04-26 ENCOUNTER — HOSPITAL ENCOUNTER (OUTPATIENT)
Dept: CARDIOLOGY | Facility: HOSPITAL | Age: 48
Discharge: HOME | End: 2023-04-26
Attending: STUDENT IN AN ORGANIZED HEALTH CARE EDUCATION/TRAINING PROGRAM
Payer: MEDICAID

## 2023-04-26 ENCOUNTER — HOSPITAL ENCOUNTER (OUTPATIENT)
Dept: RADIOLOGY | Facility: HOSPITAL | Age: 48
Discharge: HOME | End: 2023-04-26
Attending: STUDENT IN AN ORGANIZED HEALTH CARE EDUCATION/TRAINING PROGRAM
Payer: MEDICAID

## 2023-04-26 VITALS
BODY MASS INDEX: 40.1 KG/M2 | SYSTOLIC BLOOD PRESSURE: 125 MMHG | HEART RATE: 65 BPM | HEIGHT: 69 IN | DIASTOLIC BLOOD PRESSURE: 78 MMHG | WEIGHT: 270.75 LBS | RESPIRATION RATE: 20 BRPM

## 2023-04-26 DIAGNOSIS — R07.9 CHEST PAIN, UNSPECIFIED TYPE: ICD-10-CM

## 2023-04-26 LAB
CV STRESS BASE HR: 59 BPM
DIASTOLIC BLOOD PRESSURE: 78 MMHG
NUC REST EJECTION FRACTION: 59
NUC STRESS EJECTION FRACTION: 61 %
OHS CV CPX 85 PERCENT MAX PREDICTED HEART RATE MALE: 149
OHS CV CPX ESTIMATED METS: 2
OHS CV CPX MAX PREDICTED HEART RATE: 175
OHS CV CPX PATIENT IS FEMALE: 0
OHS CV CPX PATIENT IS MALE: 1
OHS CV CPX PEAK DIASTOLIC BLOOD PRESSURE: 75 MMHG
OHS CV CPX PEAK HEAR RATE: 111 BPM
OHS CV CPX PEAK RATE PRESSURE PRODUCT: NORMAL
OHS CV CPX PEAK SYSTOLIC BLOOD PRESSURE: 133 MMHG
OHS CV CPX PERCENT MAX PREDICTED HEART RATE ACHIEVED: 63
OHS CV CPX RATE PRESSURE PRODUCT PRESENTING: 7375
STRESS ECHO POST EXERCISE DUR MIN: 3 MINUTES
STRESS ECHO POST EXERCISE DUR SEC: 0 SECONDS
SYSTOLIC BLOOD PRESSURE: 125 MMHG

## 2023-04-26 PROCEDURE — 78452 HT MUSCLE IMAGE SPECT MULT: CPT

## 2023-04-26 PROCEDURE — A9502 TC99M TETROFOSMIN: HCPCS

## 2023-04-26 PROCEDURE — 93017 CV STRESS TEST TRACING ONLY: CPT

## 2023-04-26 PROCEDURE — 63600175 PHARM REV CODE 636 W HCPCS

## 2023-04-26 RX ORDER — REGADENOSON 0.08 MG/ML
INJECTION, SOLUTION INTRAVENOUS
Status: COMPLETED
Start: 2023-04-26 | End: 2023-04-26

## 2023-04-26 RX ADMIN — REGADENOSON 0.4 MG: 0.08 INJECTION, SOLUTION INTRAVENOUS at 08:04

## 2023-05-23 DIAGNOSIS — I63.9 CEREBROVASCULAR ACCIDENT (CVA), UNSPECIFIED MECHANISM: ICD-10-CM

## 2023-05-24 RX ORDER — ASPIRIN 81 MG/1
81 TABLET ORAL DAILY
Qty: 90 TABLET | Refills: 3
Start: 2023-05-24 | End: 2023-10-08 | Stop reason: SDUPTHER

## 2023-06-20 ENCOUNTER — OFFICE VISIT (OUTPATIENT)
Dept: INTERNAL MEDICINE | Facility: CLINIC | Age: 48
End: 2023-06-20
Payer: MEDICAID

## 2023-06-20 VITALS
WEIGHT: 273 LBS | OXYGEN SATURATION: 97 % | RESPIRATION RATE: 18 BRPM | BODY MASS INDEX: 40.43 KG/M2 | DIASTOLIC BLOOD PRESSURE: 74 MMHG | HEIGHT: 69 IN | HEART RATE: 77 BPM | SYSTOLIC BLOOD PRESSURE: 106 MMHG | TEMPERATURE: 98 F

## 2023-06-20 DIAGNOSIS — Z86.73 HISTORY OF CEREBROVASCULAR ACCIDENT (CVA) GREATER THAN EIGHT WEEKS IN THE PAST: ICD-10-CM

## 2023-06-20 DIAGNOSIS — G89.29 CHRONIC NONINTRACTABLE HEADACHE, UNSPECIFIED HEADACHE TYPE: Primary | ICD-10-CM

## 2023-06-20 DIAGNOSIS — L60.0 INGROWN NAIL OF GREAT TOE: ICD-10-CM

## 2023-06-20 DIAGNOSIS — J45.31 MILD PERSISTENT ASTHMA WITH ACUTE EXACERBATION: ICD-10-CM

## 2023-06-20 DIAGNOSIS — R13.10 DYSPHAGIA, UNSPECIFIED TYPE: ICD-10-CM

## 2023-06-20 DIAGNOSIS — R51.9 CHRONIC NONINTRACTABLE HEADACHE, UNSPECIFIED HEADACHE TYPE: Primary | ICD-10-CM

## 2023-06-20 DIAGNOSIS — I10 PRIMARY HYPERTENSION: ICD-10-CM

## 2023-06-20 PROCEDURE — 99215 OFFICE O/P EST HI 40 MIN: CPT | Mod: PBBFAC

## 2023-06-20 RX ORDER — PREGABALIN 50 MG/1
50 CAPSULE ORAL 3 TIMES DAILY
Qty: 90 CAPSULE | Refills: 6 | Status: SHIPPED | OUTPATIENT
Start: 2023-06-20 | End: 2023-09-10 | Stop reason: SDUPTHER

## 2023-06-20 RX ORDER — PANTOPRAZOLE SODIUM 40 MG/1
40 TABLET, DELAYED RELEASE ORAL DAILY
Qty: 30 TABLET | Refills: 2 | Status: SHIPPED | OUTPATIENT
Start: 2023-06-20 | End: 2023-09-10 | Stop reason: SDUPTHER

## 2023-06-20 RX ORDER — BUDESONIDE AND FORMOTEROL FUMARATE DIHYDRATE 160; 4.5 UG/1; UG/1
2 AEROSOL RESPIRATORY (INHALATION) EVERY 12 HOURS
Qty: 10.2 G | Refills: 1 | Status: SHIPPED | OUTPATIENT
Start: 2023-06-20 | End: 2023-09-10 | Stop reason: SDUPTHER

## 2023-06-20 RX ORDER — ALBUTEROL SULFATE 90 UG/1
2 AEROSOL, METERED RESPIRATORY (INHALATION) EVERY 6 HOURS PRN
Qty: 0.103 G | Refills: 3 | Status: SHIPPED | OUTPATIENT
Start: 2023-06-20 | End: 2023-09-10 | Stop reason: SDUPTHER

## 2023-06-20 RX ORDER — SUMATRIPTAN SUCCINATE 25 MG/1
50 TABLET ORAL
Qty: 30 TABLET | Refills: 0 | Status: SHIPPED | OUTPATIENT
Start: 2023-06-20 | End: 2023-12-04

## 2023-06-20 NOTE — PROGRESS NOTES
INTER[facility]  CLINIC NOTE    Patient Name: John Jackson  YOB: 1975    PRESENTING HISTORY       History of Present Illness:  Mr. John Jackson is a 47 y.o. male  With PMHx: HTN, HLD, CVA (2015 w/ L sided weakness), PFO, chronic low back and neck pain, hx of gastric ulcer    This is a 46 y.o AAM who presents to clinic as a routine f/u. Denies any fever, chills, chest pain, palpitations, nausea, vomiting, abdominal pain, lower extremity swelling, weakness, dizziness, syncope Patient underwent C5-C7 anterior spine fusion by Dr. Aj on 3/17/21 and was scheduled to have L3-L4 fusion with Dr. Aj on 08/16/2021, but had to cancel due to his wife needing surgery.    Interval Hx: Reports difficulty swallowing solids, no difficulty swallowing liquids. He does endorse intermittent symptoms of pain with swallowing and states the he feels the sensation of food getting stuck in his throat but never vomits. He has also lost 6 ibs since Sept 2021 until now. He reports these symptoms all started a little before August 2021 but this is the first time patient has ever mentioned anything to me. Appetite has lessened some. He denies any abdominal pain, night sweats, fever/chills. He is taking omeprazole 20mg daily which has been on chronically. I ordered lab work for today's visit which he had done in [place] but does not have records sent here yet. Will try to obtain.     Previous Visit: Was seen by Neurology, symptoms likely due to cervical myelopathy with carpopedal spasms and dystonic toes. Spasticity not due to stroke. PFO closure not necessary at this time, and was seen by Cardiology. LDL not at goal this time. Patient today complains of congestion, itchy throat, some mild SOB no wheezing. He states it has been going on for three days. Additionally still has dysphagia complaints, needs fluids to figueroa down his food otherwise solids comeback up. Not necessarily  vomiting.      Today: Flow Esophgram 1/2023 was negative.  Patient has been complaining of worsening headaches, as well as continued falls.  He states that his headaches begin either occipitally or in his right temporal area and spread associated with auras (visual) and lights. He states that it last for 1 to 3 days, totalling greater than 15 days in a month.       CURRENT MEDICATIONS      Current Outpatient Medications on File Prior to Visit   Medication Sig    albuterol (ACCUNEB) 0.63 mg/3 mL Nebu Inhale 0.63 mg into the lungs.    albuterol (VENTOLIN HFA) 90 mcg/actuation inhaler Inhale 2 puffs into the lungs every 6 (six) hours as needed for Wheezing. Rescue    amLODIPine (NORVASC) 2.5 MG tablet Take 1 tablet (2.5 mg total) by mouth once daily.    aspirin (ECOTRIN) 81 MG EC tablet Take 1 tablet (81 mg total) by mouth once daily.    budesonide-formoterol 160-4.5 mcg (SYMBICORT) 160-4.5 mcg/actuation HFAA Inhale 2 puffs into the lungs every 12 (twelve) hours. Controller    cetirizine (ZYRTEC) 5 MG tablet Take 1 tablet (5 mg total) by mouth once daily.    evolocumab (REPATHA SURECLICK) 140 mg/mL PnIj Inject 1 mL (140 mg total) into the skin every 14 (fourteen) days.    ezetimibe (ZETIA) 10 mg tablet Take 1 tablet (10 mg total) by mouth once daily.    metoprolol tartrate (LOPRESSOR) 25 MG tablet Take 1 tablet (25 mg total) by mouth 2 (two) times daily.    nitroGLYCERIN (NITROSTAT) 0.4 MG SL tablet Place 1 tablet (0.4 mg total) under the tongue every 5 (five) minutes as needed for Chest pain.    pantoprazole (PROTONIX) 40 MG tablet Take 1 tablet (40 mg total) by mouth once daily.    paroxetine (PAXIL) 30 MG tablet Take 1 tablet (30 mg total) by mouth once daily.    rosuvastatin (CRESTOR) 40 MG Tab Take 1 tablet (40 mg total) by mouth every evening.    gabapentin (NEURONTIN) 600 MG tablet Take 600 mg by mouth 3 (three) times daily.     No current facility-administered medications on file prior to visit.         Review  of Systems   HENT:  Negative for hearing loss.    Eyes:  Negative for discharge.   Respiratory:  Negative for wheezing.    Cardiovascular:  Negative for chest pain and palpitations.   Gastrointestinal:  Positive for constipation. Negative for blood in stool, diarrhea and vomiting.   Genitourinary:  Positive for urgency. Negative for hematuria.   Musculoskeletal:  Positive for neck pain.   Neurological:  Positive for weakness and headaches.   Endo/Heme/Allergies:  Positive for polydipsia.     PAST HISTORY:     Past Medical History:   Diagnosis Date    Diabetes mellitus, type 2     Hyperlipidemia     Hypertension        Past Surgical History:   Procedure Laterality Date    CERVICAL FUSION      [facility] [address]. [facility] 2021    CHOLECYSTECTOMY      FUSION OF CERVICAL SPINE BY POSTERIOR APPROACH  2017    [facility]       Family History   Problem Relation Age of Onset    No Known Problems Mother     No Known Problems Father        Social History     Socioeconomic History    Marital status: Single   Occupational History    Occupation: dissabled   Tobacco Use    Smoking status: Never    Smokeless tobacco: Never   Substance and Sexual Activity    Alcohol use: Yes     Alcohol/week: 1.0 standard drink     Types: 1 Glasses of wine per week     Comment: glass per night (sm)    Drug use: Yes     Types: Marijuana       Review of patient's allergies indicates:   Allergen Reactions    Iodine Nausea And Vomiting     Seafood.    Shellfish containing products Shortness Of Breath       OBJECTIVE:   Vital Signs:  There were no vitals filed for this visit.      No results found for this or any previous visit (from the past 24 hour(s)).      Physical Exam  Constitutional:       Appearance: He is obese.   HENT:      Head: Normocephalic.      Nose: Congestion and rhinorrhea present.   Eyes:      General: Visual field deficit present.      Extraocular Movements: Extraocular movements intact.      Pupils: Pupils are equal, round, and reactive to light.    Cardiovascular:      Rate and Rhythm: Normal rate and regular rhythm.      Pulses: Normal pulses.      Heart sounds: Normal heart sounds.   Pulmonary:      Effort: Pulmonary effort is normal.      Breath sounds: Normal breath sounds.   Musculoskeletal:      Cervical back: Rigidity and tenderness present.   Neurological:      Mental Status: He is alert.      Cranial Nerves: Cranial nerve deficit present.      Motor: Weakness and abnormal muscle tone present. No tremor.      Coordination: Romberg sign positive. Finger-Nose-Finger Test abnormal.      Comments: Left Hand strength 3+/5 vs 5/5 RH    Movement Exam:    Hypophonic, Hypomimia  No postural or rest tremors  EOM slow, not   FTN without tremor  Hypokinesia in Bilateral Upper extremities on finger taps, decrement more apparent on right, left hypokinetic  Increased tone on left, spastic     Psychiatric:         Mood and Affect: Mood normal.         Behavior: Behavior normal.       Laboratory  CMP:   Recent Labs   Lab 10/15/20  1026 03/15/21  0907 08/18/22  1607 04/03/23  0904   Sodium Level 143 142 140 142   Potassium Level 3.9 4.5 4.3 4.7   Chloride 107 107 103 106   Carbon Dioxide 29 27 29 28   Blood Urea Nitrogen 10 7.3 L 12.9 13.2   Creatinine 1.00 0.90 1.19 H 1.27 H   Glucose Level 102 102 H 87 109 H   Calcium Level Total 8.9 8.7 9.6 10.0   Albumin Level 3.9  --  4.1 4.0   Bilirubin Total 0.4  --  0.5 0.4   Bilirubin Direct <0.1  --   --   --    Bilirubin Indirect Calc. not valid  --   --   --    Aspartate Aminotransferase 28  --  25 24   Alanine Aminotransferase 38  --  30 29   Alkaline Phosphatase 72  --  73 64       CBC:   Recent Labs   Lab 10/15/20  1026 03/15/21  0907 04/03/23  0904   WBC 9.2  --  8.5   Neut # 5.48  --  4.96   RBC 5.21  --  5.50   Hgb 13.7 13.7 L 14.3   Hct 43.6 44.5 45.2   Platelet 407 H 390 365   MCV 83.7  --  82.2   RDW 14.3  --  13.9       FLP:   Recent Labs   Lab 08/18/22  1607 12/01/22  1104 04/03/23  0904   Cholesterol Total 161  219 H 224 H   HDL Cholesterol 41 45 43   LDL Cholesterol 94.00 160.00 H 153.00 H   Triglyceride 129 71 138       DM:   Recent Labs   Lab 01/13/21  1007 03/15/21  0907 08/18/22  1607 04/03/23  0904   Hemoglobin A1c 5.9  --  5.6 5.8   Creatinine  --  0.90 1.19 H 1.27 H       Thyroid:       Invalid input(s): [id number]  Anemia:   Recent Labs   Lab 08/18/22  1607 04/03/23  0904   Hgb  --  14.3   Hct  --  45.2   Iron Level 75  --    Ferritin Level 242.61  --    Transferrin 267  --    Iron Binding Capacity Total 304  --          ASSESSMENT & PLAN:         Health Maintenance Due   Topic Date Due    Colorectal Cancer Screening  Never done      Cervical spine stenosis s/p C5-7 Cervical Fusion Anterior on 3/17/21  Migraines  - MRI lumbar spine in April 2015 showed diffuse spondylitic changes with mild diffuse narrowing of neural foramina, multilevel disc bulging  - Status post neck surgery in November 2015 at [facility]  - Most recently underwent C5-C7 spine fusion by Dr. Aj, scheduled to undergo L3-L4 fusion with Dr. Aj on 08/16/2021 which was cancelled and being rescheduled for next year, advised to schedule follow up  -ordered PT, MRI Brain  - Ordered Lyrica and Sumatriptan, discontinue gabapentin    Hx of PFO/ chronic chest pain on exertion  - PFO with right to left shunt seen on CHRIS in Sept 2015  - DSE was performed on 2/2017 which was negative for ischemia  - 48-Hour Holter Monitor (1/13/21): No significant arrhythmias found  - TTE (1/13/21): EF 55%, repeat pending  - DSE (1/13/21): Negative for ischemia  - Followed by [facility] cardiology last seen on 12/2022, planning    Dysphagia -> Describes regurgitation of solid food if not follow by liquids  - Neurology note 11/2022 - cervical myelopathy with radiculopathy with residual dystonia and carpopedal spasm  - Barium Swallow 1/2023 with only mild reflux   - continue protonix 40mg daily    History of CVA with residual left-sided weakness, as reported  - Stable, no  new deficits; uses walker and/or cane at home for ambulation.  - Would like to get MRI to quantify extent of lesions, will hold off for now  - Continue aspirin 81mg daily  - LDL increased to now 160, currently on Zettia and Rosuvastatin max dose    Pre-diabetes  - A1C is 5.8% today  - Will continue metformin for now    Essential HTN, well-controlled  - BP today at goal  - Continue current medications Norvasc 2.5mg daily and metoprolol 25mg BID  - Low-sodium diet and exercise discussed    Depression, stable  - will continue home med paroxetine 30 mg daily    Asthma, Mild Persistent  -report hx of childhood asthma  -Prescribed symbicort 160-4.5m patient is compliant  -States he has 4x per month needs albuterol rescue inhaler, sometimes wakes him up at night  -reorder PFTs previously, advised to follow up  - Ordered prednisone 20mg BID PRN 10 day supply         Health Maintenance:  TDAP: 10/15/2020  CAGE screenin/4  HIV screening: ordered at last visit  Acute hep panel and syphilis Ab screening ordered at last visit (reordered today)  Influenza vaccine: flu given today  Tdap: given 10/15/2020  colon cancer screening: Will order Cologuard, advised patient to follow up on this  AAA - never smoker  COVID - UTD  PCV 23 - Given 2022    RTC 6 Months with labs, ordered PT, MRI, given Lyrica for neuropathy, sumatriptan for migraine abortive, as well as referral to Podiatry    Future Appointments     Future Appointments   Date Time Provider Department Center   2023  9:30 AM Althea Nichole MD [facility] CARD [facility]      No orders of the defined types were placed in this encounter.        Discussed with Dr. Hogue - Staff Attestation to Follow    Tu Ramirez MD  [facility] Internal Medicine PGY-1

## 2023-06-20 NOTE — PROGRESS NOTES
I have reviewed and concur with the resident's history, physical, assessment, and plan.  I have discussed with him all issues related to the diagnosis, workup and treatment plan. Care provided as reasonable and necessary.S/P C5-7 fusion. stable    Naveed Hogue MD  [facility]

## 2023-06-21 ENCOUNTER — PATIENT MESSAGE (OUTPATIENT)
Dept: ADMINISTRATIVE | Facility: HOSPITAL | Age: 48
End: 2023-06-21
Payer: MEDICAID

## 2023-07-14 NOTE — PROGRESS NOTES
CHIEF COMPLAINT:   Chief Complaint   Patient presents with    F/U 3 month visit     Denies any cardiac problems                                                  HPI:  John Jackson 47 y.o. male with a PMH significant for HTN, HLD, diet controlled DM, ?CVA x 2 (no brain MRI at the time), PFO, asthma who presents to cardiology clinic for follow up and results of testing. He reported having a coronary angiogram with Dr. Nate Mae in the past in which he was told he did not have any significant blockage. Patient completed a 48-hour Holter monitor in January 2021 in which no significant arrhythmias were found. Had a 30-day event monitor in October 2022 that did not show any arrhythmias. He completed an echocardiogram in January 2021 which revealed an ejection fraction of 55%. Dobutamine stress echocardiogram completed in January 2021 was negative for ischemia. At previous appointment, patient reported CP that occurred twice weekly that was associated with SOB and diaphoresis. Echo and Nuclear Stress ordered. Echo revealed EF 60%, mild GA, mild MR. Nuclear stress test revealed a normal myocardial perfusion scan, no evidence of ischemia or infarction, EF 59% at rest and EF 61% post stress.     Today the patient denies any complaints of chest pain, palpitations, dizziness, lightheadedness, syncope, peripheral edema. Patient endorses ongoing daily shortness of breath that is present with exertion but associates this with his severe asthma. He states that he notices occasional leg swelling that resolves on its own. Patient reports that he is able to complete ADLs without any ischemic symptoms. He endorses walking twice daily around his yard for exercise, he states that he has to take breaks while doing so due to his shortness of breath. He endorses snoring and shortness of breath while sleeping, advised patient to follow up with sleep clinic if he is still interested in doing so in order to complete evaluation/testing  for JOEL. He reports compliance with all medications.                                                                                                                                                                                                                                                                                                                                                                                                                                                 CARDIAC TESTING:  Nuclear Stress - Cardiology Interpreted 4.26.23    Normal myocardial perfusion scan. There is no evidence of myocardial ischemia or infarction.    The gated perfusion images showed an ejection fraction of 59% at rest. The gated perfusion images showed an ejection fraction of 61% post stress.    The patient reported no chest pain during the stress test.    There were no arrhythmias during stress.  On the nuclear stress test the EF is normal.  If the patient has an echo, the EF on the echo is considered more accurate.  The patient has a low risk nuclear stress test.    Echo 3.27.23  · Normal right ventricular size with normal right ventricular systolic function.  · Mild pulmonic regurgitation.  · Mild mitral regurgitation.  · The left ventricle is normal in size with normal systolic function.  · The estimated ejection fraction is 60%.  · Normal left ventricular diastolic function.  · There is no pulmonary hypertension.    Patient Active Problem List   Diagnosis    Fungal toenail infection    Palpitations    Hypertension    Cervical myelopathy with cervical radiculopathy    HLD (hyperlipidemia)     Past Surgical History:   Procedure Laterality Date    CERVICAL FUSION      [facility] Dr. [facility] 2021    CHOLECYSTECTOMY      FUSION OF CERVICAL SPINE BY POSTERIOR APPROACH  2017    [facility]     Social History     Socioeconomic History    Marital status: Single   Occupational History    Occupation: dissabled   Tobacco Use    Smoking  status: Never    Smokeless tobacco: Never   Substance and Sexual Activity    Alcohol use: Yes     Alcohol/week: 1.0 standard drink     Types: 1 Glasses of wine per week     Comment: glass per night (sm)    Drug use: Yes     Types: Marijuana        Family History   Problem Relation Age of Onset    No Known Problems Mother     No Known Problems Father      Review of patient's allergies indicates:   Allergen Reactions    Iodine Nausea And Vomiting     Seafood.    Shellfish containing products Shortness Of Breath         ROS:  Review of Systems   Constitutional: Negative.    HENT: Negative.     Eyes: Negative.    Respiratory:  Positive for shortness of breath. Negative for sputum production.    Cardiovascular:  Negative for chest pain, palpitations, orthopnea, claudication, leg swelling and PND.   Gastrointestinal: Negative.  Negative for nausea and vomiting.   Genitourinary: Negative.    Musculoskeletal:  Positive for back pain, falls (Mechanical falls) and joint pain.   Skin: Negative.    Neurological: Negative.    Endo/Heme/Allergies: Negative.    Psychiatric/Behavioral: Negative.                                                                                                                                                                                Negative except as stated in the history of present illness. See HPI for details.    PHYSICAL EXAM:  There were no vitals taken for this visit.    Physical Exam  Constitutional:       Appearance: Normal appearance. He is obese.   HENT:      Head: Normocephalic.      Nose: Nose normal.      Mouth/Throat:      Mouth: Mucous membranes are moist.   Eyes:      Extraocular Movements: Extraocular movements intact.   Cardiovascular:      Rate and Rhythm: Normal rate and regular rhythm.      Pulses: Normal pulses.      Heart sounds: Normal heart sounds. No murmur heard.  Pulmonary:      Effort: Pulmonary effort is normal. No respiratory distress.      Breath sounds: Normal  breath sounds.   Abdominal:      Palpations: Abdomen is soft.   Musculoskeletal:      Cervical back: Normal range of motion.      Right lower leg: No edema.      Left lower leg: No edema.   Skin:     General: Skin is warm and dry.   Neurological:      Mental Status: He is alert and oriented to person, place, and time.       Current Outpatient Medications   Medication Instructions    albuterol (ACCUNEB) 0.63 mg, Inhalation    albuterol (VENTOLIN HFA) 90 mcg/actuation inhaler 2 puffs, Inhalation, Every 6 hours PRN, Rescue    amLODIPine (NORVASC) 2.5 mg, Oral, Daily    aspirin (ECOTRIN) 81 mg, Oral, Daily    budesonide-formoterol 160-4.5 mcg (SYMBICORT) 160-4.5 mcg/actuation HFAA 2 puffs, Inhalation, Every 12 hours, Controller    cetirizine (ZYRTEC) 5 mg, Oral, Daily    ezetimibe (ZETIA) 10 mg, Oral, Daily    metoprolol tartrate (LOPRESSOR) 25 mg, Oral, 2 times daily    nitroGLYCERIN (NITROSTAT) 0.4 mg, Sublingual, Every 5 min PRN    pantoprazole (PROTONIX) 40 mg, Oral, Daily    paroxetine (PAXIL) 30 mg, Oral, Daily    pregabalin (LYRICA) 50 mg, Oral, 3 times daily    REPATHA SURECLICK 140 mg, Subcutaneous, Every 14 days    rosuvastatin (CRESTOR) 40 mg, Oral, Nightly    sumatriptan (IMITREX) 50 mg, Oral, Every 2 hours PRN        All medications, laboratory studies, cardiac diagnostic imaging reviewed.     Lab Results   Component Value Date    .00 (H) 04/03/2023    .00 (H) 12/01/2022    TRIG 138 04/03/2023    TRIG 71 12/01/2022    CREATININE 1.27 (H) 04/03/2023    MG 1.97 01/13/2021    K 4.7 04/03/2023        ASSESSMENT/PLAN:     Edema  - No peripheral edema today. Patient reports the edema is occasional. Recommended conservative measures with compression stockings and elevation of legs when possible.  - Echo with EF 60%, mild KS/MR, normal RV size and function, normal LV diastolic function (3.27.23)  - Nuclear Stress Test revealed normal myocardial perfusion scan, no evidence of ischemia or infarction,  rest EF 59%, post stress EF 61%, no chest pain during test, no arrhythmias during stress test (4.26.23)  - Likely non-cardiac  - Recommend further evaluation by PCP     HTN   - BP at goal 121/78  - Continue Norvasc   - Counseled on low sodium diet and exercise as tolerated     HLD   -  - not at goal   - Continue Crestor and Zetia  - Patient reports he has not been able to fill Repatha due to need for prior authorization. Discussed issue with nursing staff, will work to resolve.  - Will obtain updated Lipid panel before next office visit    - Counseled on heart healthy, low cholesterol diet and exercise as tolerated      Left-Sided Spacticity  - Concern for possible stroke in 2015 b/c of left sided weakness but MRI Brain was never performed. Pt found to have PFO on HCRIS in 2015, so there was a question as to whether he would benefit from PFO closure. Case discussed with Neurology who clarified that patient's left-sided symptoms are due to spasticity from a cervical cord compression in 2015 and is not due to a stroke. So we will not refer him for PFO closure.   - Continue Aspirin, Statin  - Management per PCP/Neurology     DM   - Diet controlled. A1c 5.6%  - Management per PCP      Suspected Sleep Apnea  - Patient reports becoming short of breath when sleeping; denies apneic episodes   - Sleep study ordered at previous visit, patient reports he did not complete  - Gave patient instructions on how to contact sleep clinic to set up appointment. Counseled on importance of proceeding with sleep study.       Patient to complete sleep study prior to next office visit   Follow up in Cardiology Clinic in 3 months  Follow up with PCP as directed

## 2023-07-17 ENCOUNTER — OFFICE VISIT (OUTPATIENT)
Dept: CARDIOLOGY | Facility: CLINIC | Age: 48
End: 2023-07-17
Payer: MEDICAID

## 2023-07-17 VITALS
SYSTOLIC BLOOD PRESSURE: 121 MMHG | HEIGHT: 69 IN | BODY MASS INDEX: 40.75 KG/M2 | OXYGEN SATURATION: 98 % | WEIGHT: 275.13 LBS | TEMPERATURE: 97 F | HEART RATE: 66 BPM | DIASTOLIC BLOOD PRESSURE: 78 MMHG | RESPIRATION RATE: 20 BRPM

## 2023-07-17 DIAGNOSIS — R00.2 PALPITATIONS: Primary | ICD-10-CM

## 2023-07-17 DIAGNOSIS — I10 PRIMARY HYPERTENSION: ICD-10-CM

## 2023-07-17 DIAGNOSIS — E78.5 HYPERLIPIDEMIA LDL GOAL <70: ICD-10-CM

## 2023-07-17 DIAGNOSIS — E78.2 MIXED HYPERLIPIDEMIA: ICD-10-CM

## 2023-07-17 PROCEDURE — 99215 OFFICE O/P EST HI 40 MIN: CPT | Mod: PBBFAC

## 2023-07-17 RX ORDER — EVOLOCUMAB 140 MG/ML
140 INJECTION, SOLUTION SUBCUTANEOUS
Qty: 7.36 ML | Refills: 2 | Status: SHIPPED | OUTPATIENT
Start: 2023-07-17 | End: 2023-10-09 | Stop reason: SDUPTHER

## 2023-07-17 RX ORDER — ALBUTEROL SULFATE 90 UG/1
2 AEROSOL, METERED RESPIRATORY (INHALATION) EVERY 6 HOURS PRN
Qty: 0.103 G | Refills: 3 | OUTPATIENT
Start: 2023-07-17 | End: 2024-07-16

## 2023-07-17 NOTE — PATIENT INSTRUCTIONS
Patient to complete sleep study prior to next office visit   Follow up in Cardiology Clinic in 3 months  Follow up with PCP as directed

## 2023-09-11 DIAGNOSIS — I10 HYPERTENSION, UNSPECIFIED TYPE: ICD-10-CM

## 2023-09-11 RX ORDER — METOPROLOL TARTRATE 25 MG/1
25 TABLET, FILM COATED ORAL 2 TIMES DAILY
Qty: 180 TABLET | Refills: 3 | Status: SHIPPED | OUTPATIENT
Start: 2023-09-11 | End: 2023-12-04 | Stop reason: SDUPTHER

## 2023-09-11 RX ORDER — BUDESONIDE AND FORMOTEROL FUMARATE DIHYDRATE 160; 4.5 UG/1; UG/1
2 AEROSOL RESPIRATORY (INHALATION) EVERY 12 HOURS
Qty: 10.2 G | Refills: 1 | Status: SHIPPED | OUTPATIENT
Start: 2023-09-11 | End: 2023-11-22 | Stop reason: SDUPTHER

## 2023-09-11 RX ORDER — PREGABALIN 50 MG/1
50 CAPSULE ORAL 3 TIMES DAILY
Qty: 90 CAPSULE | Refills: 6 | Status: SHIPPED | OUTPATIENT
Start: 2023-09-11 | End: 2023-11-22 | Stop reason: SDUPTHER

## 2023-09-11 RX ORDER — ALBUTEROL SULFATE 90 UG/1
2 AEROSOL, METERED RESPIRATORY (INHALATION) EVERY 6 HOURS PRN
Qty: 0.103 G | Refills: 3 | Status: SHIPPED | OUTPATIENT
Start: 2023-09-11 | End: 2023-10-08 | Stop reason: SDUPTHER

## 2023-09-11 RX ORDER — PANTOPRAZOLE SODIUM 40 MG/1
40 TABLET, DELAYED RELEASE ORAL DAILY
Qty: 30 TABLET | Refills: 2 | Status: SHIPPED | OUTPATIENT
Start: 2023-09-11 | End: 2023-11-22 | Stop reason: SDUPTHER

## 2023-09-11 RX ORDER — CETIRIZINE HYDROCHLORIDE 5 MG/1
5 TABLET ORAL DAILY
Qty: 30 TABLET | Refills: 11 | Status: SHIPPED | OUTPATIENT
Start: 2023-09-11 | End: 2023-11-22 | Stop reason: SDUPTHER

## 2023-09-11 RX ORDER — AMLODIPINE BESYLATE 2.5 MG/1
2.5 TABLET ORAL DAILY
Qty: 90 TABLET | Refills: 3 | Status: SHIPPED | OUTPATIENT
Start: 2023-09-11 | End: 2023-12-04 | Stop reason: SDUPTHER

## 2023-10-08 DIAGNOSIS — E78.5 HYPERLIPIDEMIA LDL GOAL <70: Primary | ICD-10-CM

## 2023-10-08 DIAGNOSIS — I63.9 CEREBROVASCULAR ACCIDENT (CVA), UNSPECIFIED MECHANISM: ICD-10-CM

## 2023-10-09 RX ORDER — ASPIRIN 81 MG/1
81 TABLET ORAL DAILY
Qty: 30 TABLET | Refills: 0
Start: 2023-10-09 | End: 2023-11-27 | Stop reason: SDUPTHER

## 2023-10-09 RX ORDER — EZETIMIBE 10 MG/1
10 TABLET ORAL DAILY
Qty: 30 TABLET | Refills: 0 | Status: SHIPPED | OUTPATIENT
Start: 2023-10-09 | End: 2023-10-25 | Stop reason: SDUPTHER

## 2023-10-09 RX ORDER — PAROXETINE 30 MG/1
30 TABLET, FILM COATED ORAL DAILY
Qty: 30 TABLET | Refills: 2 | Status: SHIPPED | OUTPATIENT
Start: 2023-10-09 | End: 2023-12-04 | Stop reason: SDUPTHER

## 2023-10-09 RX ORDER — EVOLOCUMAB 140 MG/ML
140 INJECTION, SOLUTION SUBCUTANEOUS
Qty: 2 ML | Refills: 0 | Status: SHIPPED | OUTPATIENT
Start: 2023-10-09 | End: 2023-11-08

## 2023-10-09 RX ORDER — ROSUVASTATIN CALCIUM 40 MG/1
40 TABLET, COATED ORAL NIGHTLY
Qty: 30 TABLET | Refills: 0 | Status: SHIPPED | OUTPATIENT
Start: 2023-10-09 | End: 2023-11-27 | Stop reason: SDUPTHER

## 2023-10-09 RX ORDER — ALBUTEROL SULFATE 90 UG/1
2 AEROSOL, METERED RESPIRATORY (INHALATION) EVERY 6 HOURS PRN
Qty: 0.103 G | Refills: 3 | Status: SHIPPED | OUTPATIENT
Start: 2023-10-09 | End: 2023-11-22 | Stop reason: SDUPTHER

## 2023-10-19 NOTE — PROGRESS NOTES
CHIEF COMPLAINT:   Chief Complaint   Patient presents with    Follow-up     F/u  sob w/exertion                                                  HPI:  John Jackson 48 y.o. male with a PMH significant for HTN, HLD, diet controlled DM, ?CVA x 2 (no brain MRI at the time), PFO, asthma who presents to cardiology clinic for follow up and results of testing. He reported having a coronary angiogram with Dr. Nate Mae in the past in which he was told he did not have any significant blockage. Patient completed a 48-hour Holter monitor in January 2021 in which no significant arrhythmias were found. Had a 30-day event monitor in October 2022 that did not show any arrhythmias. He completed an echocardiogram in January 2021 which revealed an ejection fraction of 55%. Dobutamine stress echocardiogram completed in January 2021 was negative for ischemia. . Echo and Nuclear Stress completed April 2023. Echo revealed EF 60%, mild IL, mild MR. Nuclear stress test revealed a normal myocardial perfusion scan, no evidence of ischemia or infarction, EF 59% at rest and EF 61% post stress. At last office visit patient complained  of occasional leg swelling and shortness of breath with over exertion that he is here stated with asthma.  At that time, he also complained snoring and shortness a breath while sleeping, patient already had sleep study order but had just had not completed it at that time.  Patient states that he has been unable to get in touch with sleep clinic to schedule study, but he will try calling again today.    Today the patient states that he is feeling well overall.  He continues to endorse occasional shortness of breath with over exertion but states it has not progressed since last office visit.  He also endorses occasional swelling in lower extremities that typically resolves on its own.  Otherwise, he denies any chest pain, palpitations, lightheadedness, dizziness, syncope, or claudication symptoms.  He states  that he is able to complete his ADLs without any issues or ischemic symptoms.  He states that he has been increasing his exercise recently and he walks around his yd.  Due to history of stroke he is somewhat unsteady on his feet, reinforced to patient only to exercise when it is safe and he has some supervision in case to falls.  He continues to endorse snoring in waking up short of breath while sleeping, patient will attempt to contact the clinic today to schedule sleep study.  Advised him to notify us if he is unable to schedule study. He reports compliance with all medications.  He denies any tobacco or illicit drug use.                                                                                                                                                                                                                                                                                                                                                                                                                                               CARDIAC TESTING:  Nuclear Stress - Cardiology Interpreted 4.26.23    Normal myocardial perfusion scan. There is no evidence of myocardial ischemia or infarction.    The gated perfusion images showed an ejection fraction of 59% at rest. The gated perfusion images showed an ejection fraction of 61% post stress.    The patient reported no chest pain during the stress test.    There were no arrhythmias during stress.  On the nuclear stress test the EF is normal.  If the patient has an echo, the EF on the echo is considered more accurate.  The patient has a low risk nuclear stress test.    Echo 3.27.23  · Normal right ventricular size with normal right ventricular systolic function.  · Mild pulmonic regurgitation.  · Mild mitral regurgitation.  · The left ventricle is normal in size with normal systolic function.  · The estimated ejection fraction is 60%.  · Normal left  ventricular diastolic function.  · There is no pulmonary hypertension.    Patient Active Problem List   Diagnosis    Fungal toenail infection    Palpitations    Hypertension    Cervical myelopathy with cervical radiculopathy    HLD (hyperlipidemia)     Past Surgical History:   Procedure Laterality Date    CERVICAL FUSION      [facility] [address] [place] 2021    CHOLECYSTECTOMY      FUSION OF CERVICAL SPINE BY POSTERIOR APPROACH  2017    [place]    SPINE SURGERY  March, 2021     Social History     Socioeconomic History    Marital status: Single   Occupational History    Occupation: dissabled   Tobacco Use    Smoking status: Never    Smokeless tobacco: Never   Substance and Sexual Activity    Alcohol use: Yes     Alcohol/week: 2.0 standard drinks of alcohol     Types: 2 Shots of liquor per week     Comment: glass per night (sm)    Drug use: Not Currently     Types: Marijuana    Sexual activity: Not Currently     Partners: Female     Birth control/protection: None        Family History   Problem Relation Age of Onset    No Known Problems Mother     No Known Problems Father     Diabetes Paternal Grandfather      Review of patient's allergies indicates:   Allergen Reactions    Iodine Nausea And Vomiting     Seafood.    Shellfish containing products Shortness Of Breath         ROS:  Review of Systems   Constitutional: Negative.    HENT: Negative.     Eyes: Negative.    Respiratory:  Positive for shortness of breath. Negative for sputum production.    Cardiovascular:  Negative for chest pain, palpitations, orthopnea, claudication, leg swelling and PND.   Gastrointestinal: Negative.  Negative for nausea and vomiting.   Genitourinary: Negative.    Musculoskeletal:  Positive for back pain, falls (Mechanical falls) and joint pain.   Skin: Negative.    Neurological: Negative.    Endo/Heme/Allergies: Negative.    Psychiatric/Behavioral: Negative.                                                                                               "                                                                                    Negative except as stated in the history of present illness. See HPI for details.    PHYSICAL EXAM:  Visit Vitals  /86 (BP Location: Right arm, Patient Position: Sitting, BP Method: Medium (Automatic))   Pulse 70   Temp 97.7 °F (36.5 °C)   Resp 18   Ht 5' 9" (1.753 m)   Wt 125.7 kg (277 lb 1.9 oz)   SpO2 100%   BMI 40.92 kg/m²       Physical Exam  Constitutional:       Appearance: Normal appearance. He is obese.   HENT:      Head: Normocephalic.      Nose: Nose normal.      Mouth/Throat:      Mouth: Mucous membranes are moist.   Eyes:      Extraocular Movements: Extraocular movements intact.   Cardiovascular:      Rate and Rhythm: Normal rate and regular rhythm.      Pulses: Normal pulses.      Heart sounds: Normal heart sounds. No murmur heard.  Pulmonary:      Effort: Pulmonary effort is normal. No respiratory distress.      Breath sounds: Normal breath sounds.   Abdominal:      Palpations: Abdomen is soft.   Musculoskeletal:      Cervical back: Normal range of motion.      Right lower leg: No edema.      Left lower leg: No edema.   Skin:     General: Skin is warm and dry.   Neurological:      Mental Status: He is alert and oriented to person, place, and time.         Current Outpatient Medications   Medication Instructions    albuterol (ACCUNEB) 0.63 mg, Inhalation    albuterol (VENTOLIN HFA) 90 mcg/actuation inhaler 2 puffs, Inhalation, Every 6 hours PRN, Rescue    amLODIPine (NORVASC) 2.5 mg, Oral, Daily    aspirin (ECOTRIN) 81 mg, Oral, Daily    budesonide-formoterol 160-4.5 mcg (SYMBICORT) 160-4.5 mcg/actuation HFAA 2 puffs, Inhalation, Every 12 hours, Controller    cetirizine (ZYRTEC) 5 mg, Oral, Daily    ezetimibe (ZETIA) 10 mg, Oral, Daily    metoprolol tartrate (LOPRESSOR) 25 mg, Oral, 2 times daily    nitroGLYCERIN (NITROSTAT) 0.4 mg, Sublingual, Every 5 min PRN    pantoprazole (PROTONIX) 40 mg, Oral, Daily    " paroxetine (PAXIL) 30 mg, Oral, Daily    pregabalin (LYRICA) 50 mg, Oral, 3 times daily    REPATHA SURECLICK 140 mg, Subcutaneous, Every 14 days    rosuvastatin (CRESTOR) 40 mg, Oral, Nightly    sumatriptan (IMITREX) 50 mg, Oral, Every 2 hours PRN        All medications, laboratory studies, cardiac diagnostic imaging reviewed.     Lab Results   Component Value Date    .00 (H) 04/03/2023    .00 (H) 12/01/2022    TRIG 138 04/03/2023    TRIG 71 12/01/2022    CREATININE 1.27 (H) 04/03/2023    MG 1.97 01/13/2021    K 4.7 04/03/2023        ASSESSMENT/PLAN:     Edema  - Reports occasional peripheral edema when on feet for extended periods of time. Recommended conservative measures with compression stockings and elevation of legs when possible.  - Echo with EF 60%, mild WA/MR, normal RV size and function, normal LV diastolic function (3.27.23)  - Nuclear Stress Test revealed normal myocardial perfusion scan, no evidence of ischemia or infarction, rest EF 59%, post stress EF 61%, no chest pain during test, no arrhythmias during stress test (4.26.23)  - Likely non-cardiac  - Recommend further evaluation by PCP     HTN   - BP at goal 129/86  - Continue Norvasc   - Counseled on low sodium diet and exercise as tolerated     HLD   -  - not at goal   - Continue Crestor and Zetia  - Was recently approved to start Repatha and is tolerating well   - Will obtain updated Lipid panel before next office visit   - Counseled on heart healthy, low cholesterol diet and exercise as tolerated      Left-Sided Spacticity/Weakness  - Concern for possible stroke in 2015 b/c of left sided weakness but MRI Brain was never performed. Pt found to have PFO on CHRIS in 2015, so there was a question as to whether he would benefit from PFO closure. Case discussed with Neurology who clarified that patient's left-sided symptoms are due to spasticity from a cervical cord compression in 2015 and is not due to a stroke. So we will not refer  him for PFO closure.   - Continue Aspirin, Statin  - Management per PCP/Neurology     DM   - Diet controlled. A1c 5.6%  - Management per PCP      Suspected Sleep Apnea  - Patient reports becoming short of breath when sleeping; denies apneic episodes   - Sleep study ordered at previous visit, patient reports he did not complete  - Gave patient instructions on how to contact sleep clinic to set up appointment. Counseled on importance of proceeding with sleep study.       Complete labs around December 2023   Complete sleep study-call if there are any issues scheduling appointment  Follow up in cardiology clinic in 4 months or sooner if needed  Follow up with PCP as directed

## 2023-10-25 ENCOUNTER — OFFICE VISIT (OUTPATIENT)
Dept: CARDIOLOGY | Facility: CLINIC | Age: 48
End: 2023-10-25
Payer: MEDICAID

## 2023-10-25 VITALS
RESPIRATION RATE: 18 BRPM | OXYGEN SATURATION: 100 % | SYSTOLIC BLOOD PRESSURE: 129 MMHG | HEART RATE: 70 BPM | DIASTOLIC BLOOD PRESSURE: 86 MMHG | HEIGHT: 69 IN | WEIGHT: 277.13 LBS | BODY MASS INDEX: 41.04 KG/M2 | TEMPERATURE: 98 F

## 2023-10-25 DIAGNOSIS — E78.2 MIXED HYPERLIPIDEMIA: ICD-10-CM

## 2023-10-25 DIAGNOSIS — I10 PRIMARY HYPERTENSION: ICD-10-CM

## 2023-10-25 DIAGNOSIS — E78.5 HYPERLIPIDEMIA LDL GOAL <70: ICD-10-CM

## 2023-10-25 DIAGNOSIS — R00.2 PALPITATIONS: Primary | ICD-10-CM

## 2023-10-25 DIAGNOSIS — I63.9 CEREBROVASCULAR ACCIDENT (CVA), UNSPECIFIED MECHANISM: ICD-10-CM

## 2023-10-25 PROCEDURE — 99215 OFFICE O/P EST HI 40 MIN: CPT | Mod: PBBFAC

## 2023-10-25 RX ORDER — EZETIMIBE 10 MG/1
10 TABLET ORAL DAILY
Qty: 90 TABLET | Refills: 3 | Status: SHIPPED | OUTPATIENT
Start: 2023-10-25 | End: 2023-12-04 | Stop reason: SDUPTHER

## 2023-10-25 NOTE — PATIENT INSTRUCTIONS
Complete labs around December 2023   Complete sleep study-call if there are any issues scheduling appointment  Follow up in cardiology clinic in 4 months or sooner if needed  Follow up with PCP as directed

## 2023-11-22 RX ORDER — PREGABALIN 50 MG/1
50 CAPSULE ORAL 3 TIMES DAILY
Qty: 90 CAPSULE | Refills: 6 | Status: SHIPPED | OUTPATIENT
Start: 2023-11-22 | End: 2023-12-04 | Stop reason: SDUPTHER

## 2023-11-22 RX ORDER — PANTOPRAZOLE SODIUM 40 MG/1
40 TABLET, DELAYED RELEASE ORAL DAILY
Qty: 30 TABLET | Refills: 2 | Status: SHIPPED | OUTPATIENT
Start: 2023-11-22 | End: 2023-12-04 | Stop reason: SDUPTHER

## 2023-11-22 RX ORDER — BUDESONIDE AND FORMOTEROL FUMARATE DIHYDRATE 160; 4.5 UG/1; UG/1
2 AEROSOL RESPIRATORY (INHALATION) EVERY 12 HOURS
Qty: 10.2 G | Refills: 1 | Status: SHIPPED | OUTPATIENT
Start: 2023-11-22 | End: 2023-12-04 | Stop reason: SDUPTHER

## 2023-11-22 RX ORDER — CETIRIZINE HYDROCHLORIDE 5 MG/1
5 TABLET ORAL DAILY
Qty: 30 TABLET | Refills: 11 | Status: SHIPPED | OUTPATIENT
Start: 2023-11-22 | End: 2024-03-27 | Stop reason: SDUPTHER

## 2023-11-22 RX ORDER — ALBUTEROL SULFATE 90 UG/1
2 AEROSOL, METERED RESPIRATORY (INHALATION) EVERY 6 HOURS PRN
Qty: 0.103 G | Refills: 3 | Status: SHIPPED | OUTPATIENT
Start: 2023-11-22 | End: 2023-12-04 | Stop reason: SDUPTHER

## 2023-11-27 DIAGNOSIS — E78.5 HYPERLIPIDEMIA LDL GOAL <70: ICD-10-CM

## 2023-11-27 DIAGNOSIS — I63.9 CEREBROVASCULAR ACCIDENT (CVA), UNSPECIFIED MECHANISM: ICD-10-CM

## 2023-11-27 RX ORDER — ROSUVASTATIN CALCIUM 40 MG/1
40 TABLET, COATED ORAL NIGHTLY
Qty: 30 TABLET | Refills: 4 | Status: SHIPPED | OUTPATIENT
Start: 2023-11-27 | End: 2023-12-04 | Stop reason: SDUPTHER

## 2023-11-27 RX ORDER — ASPIRIN 81 MG/1
81 TABLET ORAL DAILY
Qty: 30 TABLET | Refills: 4 | Status: SHIPPED | OUTPATIENT
Start: 2023-11-27 | End: 2023-12-04 | Stop reason: SDUPTHER

## 2023-12-04 ENCOUNTER — LAB VISIT (OUTPATIENT)
Dept: LAB | Facility: HOSPITAL | Age: 48
End: 2023-12-04
Payer: MEDICAID

## 2023-12-04 ENCOUNTER — OFFICE VISIT (OUTPATIENT)
Dept: INTERNAL MEDICINE | Facility: CLINIC | Age: 48
End: 2023-12-04
Payer: MEDICAID

## 2023-12-04 VITALS
TEMPERATURE: 98 F | SYSTOLIC BLOOD PRESSURE: 124 MMHG | OXYGEN SATURATION: 99 % | RESPIRATION RATE: 18 BRPM | HEIGHT: 69 IN | BODY MASS INDEX: 41.12 KG/M2 | WEIGHT: 277.63 LBS | DIASTOLIC BLOOD PRESSURE: 81 MMHG | HEART RATE: 61 BPM

## 2023-12-04 DIAGNOSIS — R13.10 DYSPHAGIA, UNSPECIFIED TYPE: ICD-10-CM

## 2023-12-04 DIAGNOSIS — G47.33 OSA (OBSTRUCTIVE SLEEP APNEA): ICD-10-CM

## 2023-12-04 DIAGNOSIS — I10 PRIMARY HYPERTENSION: ICD-10-CM

## 2023-12-04 DIAGNOSIS — Z12.11 SPECIAL SCREENING FOR MALIGNANT NEOPLASMS, COLON: Primary | ICD-10-CM

## 2023-12-04 DIAGNOSIS — Z86.73 HISTORY OF CEREBROVASCULAR ACCIDENT (CVA) GREATER THAN EIGHT WEEKS IN THE PAST: ICD-10-CM

## 2023-12-04 DIAGNOSIS — B35.1 FUNGAL NAIL INFECTION: ICD-10-CM

## 2023-12-04 DIAGNOSIS — G89.29 CHRONIC NONINTRACTABLE HEADACHE, UNSPECIFIED HEADACHE TYPE: ICD-10-CM

## 2023-12-04 DIAGNOSIS — I63.9 CEREBROVASCULAR ACCIDENT (CVA), UNSPECIFIED MECHANISM: ICD-10-CM

## 2023-12-04 DIAGNOSIS — E78.5 HYPERLIPIDEMIA LDL GOAL <70: ICD-10-CM

## 2023-12-04 DIAGNOSIS — R51.9 CHRONIC NONINTRACTABLE HEADACHE, UNSPECIFIED HEADACHE TYPE: ICD-10-CM

## 2023-12-04 DIAGNOSIS — R06.02 SOB (SHORTNESS OF BREATH): ICD-10-CM

## 2023-12-04 DIAGNOSIS — I10 HYPERTENSION, UNSPECIFIED TYPE: ICD-10-CM

## 2023-12-04 LAB
ALBUMIN SERPL-MCNC: 3.7 G/DL (ref 3.5–5)
ALBUMIN/GLOB SERPL: 1 RATIO (ref 1.1–2)
ALP SERPL-CCNC: 67 UNIT/L (ref 40–150)
ALT SERPL-CCNC: 29 UNIT/L (ref 0–55)
AST SERPL-CCNC: 25 UNIT/L (ref 5–34)
BASOPHILS # BLD AUTO: 0.05 X10(3)/MCL
BASOPHILS NFR BLD AUTO: 0.5 %
BILIRUB SERPL-MCNC: 0.4 MG/DL
BUN SERPL-MCNC: 13.7 MG/DL (ref 8.9–20.6)
CALCIUM SERPL-MCNC: 9.4 MG/DL (ref 8.4–10.2)
CHLORIDE SERPL-SCNC: 108 MMOL/L (ref 98–107)
CHOLEST SERPL-MCNC: 142 MG/DL
CHOLEST/HDLC SERPL: 4 {RATIO} (ref 0–5)
CO2 SERPL-SCNC: 29 MMOL/L (ref 22–29)
CREAT SERPL-MCNC: 1.18 MG/DL (ref 0.73–1.18)
EOSINOPHIL # BLD AUTO: 0.29 X10(3)/MCL (ref 0–0.9)
EOSINOPHIL NFR BLD AUTO: 3.1 %
ERYTHROCYTE [DISTWIDTH] IN BLOOD BY AUTOMATED COUNT: 14 % (ref 11.5–17)
EST. AVERAGE GLUCOSE BLD GHB EST-MCNC: 114 MG/DL
FERRITIN SERPL-MCNC: 146.42 NG/ML (ref 21.81–274.66)
GFR SERPLBLD CREATININE-BSD FMLA CKD-EPI: >60 MLS/MIN/1.73/M2
GLOBULIN SER-MCNC: 3.7 GM/DL (ref 2.4–3.5)
GLUCOSE SERPL-MCNC: 112 MG/DL (ref 74–100)
HAV IGM SERPL QL IA: NONREACTIVE
HBA1C MFR BLD: 5.6 %
HBV CORE IGM SERPL QL IA: NONREACTIVE
HBV SURFACE AG SERPL QL IA: NONREACTIVE
HCT VFR BLD AUTO: 43.5 % (ref 42–52)
HCV AB SERPL QL IA: NONREACTIVE
HDLC SERPL-MCNC: 40 MG/DL (ref 35–60)
HGB BLD-MCNC: 14 G/DL (ref 14–18)
IMM GRANULOCYTES # BLD AUTO: 0.04 X10(3)/MCL (ref 0–0.04)
IMM GRANULOCYTES NFR BLD AUTO: 0.4 %
LDLC SERPL CALC-MCNC: 86 MG/DL (ref 50–140)
LYMPHOCYTES # BLD AUTO: 2.96 X10(3)/MCL (ref 0.6–4.6)
LYMPHOCYTES NFR BLD AUTO: 32 %
MCH RBC QN AUTO: 26.7 PG (ref 27–31)
MCHC RBC AUTO-ENTMCNC: 32.2 G/DL (ref 33–36)
MCV RBC AUTO: 82.9 FL (ref 80–94)
MONOCYTES # BLD AUTO: 0.63 X10(3)/MCL (ref 0.1–1.3)
MONOCYTES NFR BLD AUTO: 6.8 %
NEUTROPHILS # BLD AUTO: 5.28 X10(3)/MCL (ref 2.1–9.2)
NEUTROPHILS NFR BLD AUTO: 57.2 %
NRBC BLD AUTO-RTO: 0 %
PLATELET # BLD AUTO: 377 X10(3)/MCL (ref 130–400)
PMV BLD AUTO: 10.4 FL (ref 7.4–10.4)
POTASSIUM SERPL-SCNC: 4.3 MMOL/L (ref 3.5–5.1)
PROT SERPL-MCNC: 7.4 GM/DL (ref 6.4–8.3)
RBC # BLD AUTO: 5.25 X10(6)/MCL (ref 4.7–6.1)
SODIUM SERPL-SCNC: 143 MMOL/L (ref 136–145)
T PALLIDUM AB SER QL: NONREACTIVE
T4 FREE SERPL-MCNC: 0.73 NG/DL (ref 0.7–1.48)
TRIGL SERPL-MCNC: 81 MG/DL (ref 34–140)
TSH SERPL-ACNC: 1.35 UIU/ML (ref 0.35–4.94)
VLDLC SERPL CALC-MCNC: 16 MG/DL
WBC # SPEC AUTO: 9.25 X10(3)/MCL (ref 4.5–11.5)

## 2023-12-04 PROCEDURE — 36415 COLL VENOUS BLD VENIPUNCTURE: CPT

## 2023-12-04 PROCEDURE — 82728 ASSAY OF FERRITIN: CPT

## 2023-12-04 PROCEDURE — 80061 LIPID PANEL: CPT

## 2023-12-04 PROCEDURE — 86780 TREPONEMA PALLIDUM: CPT

## 2023-12-04 PROCEDURE — 80053 COMPREHEN METABOLIC PANEL: CPT

## 2023-12-04 PROCEDURE — 80074 ACUTE HEPATITIS PANEL: CPT

## 2023-12-04 PROCEDURE — 85025 COMPLETE CBC W/AUTO DIFF WBC: CPT

## 2023-12-04 PROCEDURE — 84439 ASSAY OF FREE THYROXINE: CPT

## 2023-12-04 PROCEDURE — 83036 HEMOGLOBIN GLYCOSYLATED A1C: CPT

## 2023-12-04 PROCEDURE — 99215 OFFICE O/P EST HI 40 MIN: CPT | Mod: PBBFAC

## 2023-12-04 PROCEDURE — 84443 ASSAY THYROID STIM HORMONE: CPT

## 2023-12-04 RX ORDER — PANTOPRAZOLE SODIUM 40 MG/1
40 TABLET, DELAYED RELEASE ORAL DAILY
Qty: 30 TABLET | Refills: 2 | Status: SHIPPED | OUTPATIENT
Start: 2023-12-04 | End: 2024-03-27 | Stop reason: SDUPTHER

## 2023-12-04 RX ORDER — BUDESONIDE AND FORMOTEROL FUMARATE DIHYDRATE 160; 4.5 UG/1; UG/1
2 AEROSOL RESPIRATORY (INHALATION) EVERY 12 HOURS
Qty: 10.2 G | Refills: 1 | Status: SHIPPED | OUTPATIENT
Start: 2023-12-04 | End: 2024-03-27 | Stop reason: SDUPTHER

## 2023-12-04 RX ORDER — ROSUVASTATIN CALCIUM 40 MG/1
40 TABLET, COATED ORAL NIGHTLY
Qty: 30 TABLET | Refills: 4 | Status: SHIPPED | OUTPATIENT
Start: 2023-12-04 | End: 2024-03-27 | Stop reason: SDUPTHER

## 2023-12-04 RX ORDER — PAROXETINE 30 MG/1
30 TABLET, FILM COATED ORAL DAILY
Qty: 30 TABLET | Refills: 2 | Status: SHIPPED | OUTPATIENT
Start: 2023-12-04 | End: 2024-03-27 | Stop reason: SDUPTHER

## 2023-12-04 RX ORDER — ALBUTEROL SULFATE 90 UG/1
2 AEROSOL, METERED RESPIRATORY (INHALATION) EVERY 6 HOURS PRN
Qty: 0.103 G | Refills: 3 | Status: SHIPPED | OUTPATIENT
Start: 2023-12-04 | End: 2024-03-27

## 2023-12-04 RX ORDER — PREDNISONE 10 MG/1
10 TABLET ORAL
Qty: 7 TABLET | Refills: 0 | Status: SHIPPED | OUTPATIENT
Start: 2023-12-04

## 2023-12-04 RX ORDER — ASPIRIN 81 MG/1
81 TABLET ORAL DAILY
Qty: 30 TABLET | Refills: 4 | Status: SHIPPED | OUTPATIENT
Start: 2023-12-04 | End: 2024-03-27 | Stop reason: SDUPTHER

## 2023-12-04 RX ORDER — PREGABALIN 75 MG/1
75 CAPSULE ORAL 3 TIMES DAILY
Qty: 90 CAPSULE | Refills: 6 | Status: SHIPPED | OUTPATIENT
Start: 2023-12-04 | End: 2024-07-01

## 2023-12-04 RX ORDER — AMLODIPINE BESYLATE 2.5 MG/1
2.5 TABLET ORAL DAILY
Qty: 90 TABLET | Refills: 3 | Status: SHIPPED | OUTPATIENT
Start: 2023-12-04 | End: 2024-03-27 | Stop reason: SDUPTHER

## 2023-12-04 RX ORDER — EZETIMIBE 10 MG/1
10 TABLET ORAL DAILY
Qty: 90 TABLET | Refills: 3 | Status: SHIPPED | OUTPATIENT
Start: 2023-12-04 | End: 2024-03-27 | Stop reason: SDUPTHER

## 2023-12-04 RX ORDER — METOPROLOL TARTRATE 25 MG/1
25 TABLET, FILM COATED ORAL 2 TIMES DAILY
Qty: 180 TABLET | Refills: 3 | Status: SHIPPED | OUTPATIENT
Start: 2023-12-04 | End: 2024-03-27 | Stop reason: SDUPTHER

## 2023-12-04 NOTE — PROGRESS NOTES
[facility]  CLINIC NOTE    Patient Name: John Jackson  YOB: 1975    PRESENTING HISTORY       History of Present Illness:  Mr. John Jackson is a 48 y.o. male  With PMHx: HTN, HLD, CVA (2015 w/ L sided weakness), PFO, chronic low back and neck pain, hx of gastric ulcer    This is a 46 y.o AAM who presents to clinic as a routine f/u. Denies any fever, chills, chest pain, palpitations, nausea, vomiting, abdominal pain, lower extremity swelling, weakness, dizziness, syncope Patient underwent C5-C7 anterior spine fusion by Dr. Aj on 3/17/21 and was scheduled to have L3-L4 fusion with Dr. Aj on 08/16/2021, but had to cancel due to his wife needing surgery.    Interval Hx: Reports difficulty swallowing solids, no difficulty swallowing liquids. He does endorse intermittent symptoms of pain with swallowing and states the he feels the sensation of food getting stuck in his throat but never vomits. He has also lost 6 ibs since Sept 2021 until now. He reports these symptoms all started a little before August 2021 but this is the first time patient has ever mentioned anything to me. Appetite has lessened some. He denies any abdominal pain, night sweats, fever/chills. He is taking omeprazole 20mg daily which has been on chronically. I ordered lab work for today's visit which he had done in [place] but does not have records sent here yet. Will try to obtain.     Was seen by Neurology, symptoms likely due to cervical myelopathy with carpopedal spasms and dystonic toes. Spasticity not due to stroke. PFO closure not necessary at this time, and was seen by Cardiology. LDL not at goal this time. Patient today complains of congestion, itchy throat, some mild SOB no wheezing. He states it has been going on for three days. Additionally still has dysphagia complaints, needs fluids to figueroa down his food otherwise solids comeback up. Not necessarily vomiting.    LOV: Flow Esophgram  1/2023 was negative.  Patient has been complaining of worsening headaches, as well as continued falls.  He states that his headaches begin either occipitally or in his right temporal area and spread associated with auras (visual) and lights. He states that it last for 1 to 3 days, totalling greater than 15 days in a month.     Today: States he has shortness of breath that previously had been prevented with Symbicort. Headaches are still present however not a chief complaint. Lyrica doesn't help as much with pain as current dosage. Still has follow up for multiple specialties. See below in plan.      CURRENT MEDICATIONS      Current Outpatient Medications on File Prior to Visit   Medication Sig    albuterol (ACCUNEB) 0.63 mg/3 mL Nebu Inhale 0.63 mg into the lungs.    albuterol (VENTOLIN HFA) 90 mcg/actuation inhaler Inhale 2 puffs into the lungs every 6 (six) hours as needed for Wheezing. Rescue    amLODIPine (NORVASC) 2.5 MG tablet Take 1 tablet (2.5 mg total) by mouth once daily.    aspirin (ECOTRIN) 81 MG EC tablet Take 1 tablet (81 mg total) by mouth once daily.    budesonide-formoterol 160-4.5 mcg (SYMBICORT) 160-4.5 mcg/actuation HFAA Inhale 2 puffs into the lungs every 12 (twelve) hours. Controller    cetirizine (ZYRTEC) 5 MG tablet Take 1 tablet (5 mg total) by mouth once daily.    ezetimibe (ZETIA) 10 mg tablet Take 1 tablet (10 mg total) by mouth once daily.    metoprolol tartrate (LOPRESSOR) 25 MG tablet Take 1 tablet (25 mg total) by mouth 2 (two) times daily.    nitroGLYCERIN (NITROSTAT) 0.4 MG SL tablet Place 1 tablet (0.4 mg total) under the tongue every 5 (five) minutes as needed for Chest pain.    pantoprazole (PROTONIX) 40 MG tablet Take 1 tablet (40 mg total) by mouth once daily.    paroxetine (PAXIL) 30 MG tablet Take 1 tablet (30 mg total) by mouth once daily.    pregabalin (LYRICA) 50 MG capsule Take 1 capsule (50 mg total) by mouth 3 (three) times daily.    rosuvastatin (CRESTOR) 40 MG Tab  Take 1 tablet (40 mg total) by mouth every evening.    sumatriptan (IMITREX) 25 MG Tab Take 2 tablets (50 mg total) by mouth every 2 (two) hours as needed (Upon onset of headaches).     No current facility-administered medications on file prior to visit.         Review of Systems   HENT:  Negative for hearing loss.    Eyes:  Negative for discharge.   Respiratory:  Negative for wheezing.    Cardiovascular:  Negative for chest pain and palpitations.   Gastrointestinal:  Positive for constipation. Negative for blood in stool, diarrhea and vomiting.   Genitourinary:  Positive for urgency. Negative for hematuria.   Musculoskeletal:  Positive for neck pain.   Neurological:  Positive for weakness and headaches.   Endo/Heme/Allergies:  Positive for polydipsia.       PAST HISTORY:     Past Medical History:   Diagnosis Date    Diabetes mellitus, type 2     Hyperlipidemia     Hypertension        Past Surgical History:   Procedure Laterality Date    CERVICAL FUSION      [facility] [place] 2021    CHOLECYSTECTOMY      FUSION OF CERVICAL SPINE BY POSTERIOR APPROACH  2017    [place]    SPINE SURGERY  March, 2021       Family History   Problem Relation Age of Onset    No Known Problems Mother     No Known Problems Father     Diabetes Paternal Grandfather        Social History     Socioeconomic History    Marital status: Single   Occupational History    Occupation: dissabled   Tobacco Use    Smoking status: Never    Smokeless tobacco: Never   Substance and Sexual Activity    Alcohol use: Yes     Alcohol/week: 6.0 standard drinks of alcohol     Types: 4 Glasses of wine, 2 Shots of liquor per week     Comment: glass per night (sm)    Drug use: Not Currently     Types: Marijuana     Comment: 3 puffs twice a day    Sexual activity: Not Currently     Partners: Female     Birth control/protection: None       Review of patient's allergies indicates:   Allergen Reactions    Iodine Nausea And Vomiting     Seafood.    Shellfish containing  "products Shortness Of Breath       OBJECTIVE:   Vital Signs:  Vitals:    12/04/23 0751   BP: 124/81   Pulse: 61   Resp: 18   Temp: 97.8 °F (36.6 °C)   TempSrc: Oral   SpO2: 99%   Weight: 125.9 kg (277 lb 9.6 oz)   Height: 5' 9" (1.753 m)         Recent Results (from the past 24 hour(s))   Hemoglobin A1C    Collection Time: 12/04/23  7:00 AM   Result Value Ref Range    Hemoglobin A1c 5.6 <=7.0 %    Estimated Average Glucose 114.0 mg/dL   Comprehensive Metabolic Panel    Collection Time: 12/04/23  7:00 AM   Result Value Ref Range    Sodium Level 143 136 - 145 mmol/L    Potassium Level 4.3 3.5 - 5.1 mmol/L    Chloride 108 (H) 98 - 107 mmol/L    Carbon Dioxide 29 22 - 29 mmol/L    Glucose Level 112 (H) 74 - 100 mg/dL    Blood Urea Nitrogen 13.7 8.9 - 20.6 mg/dL    Creatinine 1.18 0.73 - 1.18 mg/dL    Calcium Level Total 9.4 8.4 - 10.2 mg/dL    Protein Total 7.4 6.4 - 8.3 gm/dL    Albumin Level 3.7 3.5 - 5.0 g/dL    Globulin 3.7 (H) 2.4 - 3.5 gm/dL    Albumin/Globulin Ratio 1.0 (L) 1.1 - 2.0 ratio    Bilirubin Total 0.4 <=1.5 mg/dL    Alkaline Phosphatase 67 40 - 150 unit/L    Alanine Aminotransferase 29 0 - 55 unit/L    Aspartate Aminotransferase 25 5 - 34 unit/L    eGFR >60 mls/min/1.73/m2   Ferritin    Collection Time: 12/04/23  7:00 AM   Result Value Ref Range    Ferritin Level 146.42 21.81 - 274.66 ng/mL   T4, Free    Collection Time: 12/04/23  7:00 AM   Result Value Ref Range    Thyroxine Free 0.73 0.70 - 1.48 ng/dL   Lipid Panel    Collection Time: 12/04/23  7:00 AM   Result Value Ref Range    Cholesterol Total 142 <=200 mg/dL    HDL Cholesterol 40 35 - 60 mg/dL    Triglyceride 81 34 - 140 mg/dL    Cholesterol/HDL Ratio 4 0 - 5    Very Low Density Lipoprotein 16     LDL Cholesterol 86.00 50.00 - 140.00 mg/dL   TSH    Collection Time: 12/04/23  7:00 AM   Result Value Ref Range    TSH 1.346 0.350 - 4.940 uIU/mL   SYPHILIS ANTIBODY (WITH REFLEX RPR)    Collection Time: 12/04/23  7:00 AM   Result Value Ref Range    " Syphilis Antibody Nonreactive Nonreactive, Equivocal   CBC with Differential    Collection Time: 12/04/23  7:00 AM   Result Value Ref Range    WBC 9.25 4.50 - 11.50 x10(3)/mcL    RBC 5.25 4.70 - 6.10 x10(6)/mcL    Hgb 14.0 14.0 - 18.0 g/dL    Hct 43.5 42.0 - 52.0 %    MCV 82.9 80.0 - 94.0 fL    MCH 26.7 (L) 27.0 - 31.0 pg    MCHC 32.2 (L) 33.0 - 36.0 g/dL    RDW 14.0 11.5 - 17.0 %    Platelet 377 130 - 400 x10(3)/mcL    MPV 10.4 7.4 - 10.4 fL    Neut % 57.2 %    Lymph % 32.0 %    Mono % 6.8 %    Eos % 3.1 %    Basophil % 0.5 %    Lymph # 2.96 0.6 - 4.6 x10(3)/mcL    Neut # 5.28 2.1 - 9.2 x10(3)/mcL    Mono # 0.63 0.1 - 1.3 x10(3)/mcL    Eos # 0.29 0 - 0.9 x10(3)/mcL    Baso # 0.05 <=0.2 x10(3)/mcL    IG# 0.04 0 - 0.04 x10(3)/mcL    IG% 0.4 %    NRBC% 0.0 %         Physical Exam  Constitutional:       Appearance: He is obese.   HENT:      Head: Normocephalic.      Nose: Congestion and rhinorrhea present.   Eyes:      General: Visual field deficit present.      Extraocular Movements: Extraocular movements intact.      Pupils: Pupils are equal, round, and reactive to light.   Cardiovascular:      Rate and Rhythm: Normal rate and regular rhythm.      Pulses: Normal pulses.      Heart sounds: Normal heart sounds.   Pulmonary:      Effort: Pulmonary effort is normal.      Breath sounds: Normal breath sounds.   Musculoskeletal:      Cervical back: Rigidity and tenderness present.   Neurological:      Mental Status: He is alert.      Cranial Nerves: Cranial nerve deficit present.      Motor: Weakness and abnormal muscle tone present. No tremor.      Coordination: Romberg sign positive. Finger-Nose-Finger Test abnormal.      Comments: Left Hand strength 3+/5 vs 5/5 RH    Movement Exam:    Hypophonic, Hypomimia  No postural or rest tremors  EOM slow, not   FTN without tremor  Hypokinesia in Bilateral Upper extremities on finger taps, decrement more apparent on right, left hypokinetic  Increased tone on left, spastic      Psychiatric:         Mood and Affect: Mood normal.         Behavior: Behavior normal.         Laboratory  CMP:   Recent Labs   Lab 08/18/22  1607 04/03/23  0904 12/04/23  0700   Sodium Level 140 142 143   Potassium Level 4.3 4.7 4.3   Chloride 103 106 108 H   Carbon Dioxide 29 28 29   Blood Urea Nitrogen 12.9 13.2 13.7   Creatinine 1.19 H 1.27 H 1.18   Glucose Level 87 109 H 112 H   Calcium Level Total 9.6 10.0 9.4   Albumin Level 4.1 4.0 3.7   Bilirubin Total 0.5 0.4 0.4   Aspartate Aminotransferase 25 24 25   Alanine Aminotransferase 30 29 29   Alkaline Phosphatase 73 64 67     CBC:   Recent Labs   Lab 03/15/21  0907 04/03/23  0904 12/04/23  0700   WBC  --  8.5 9.25   Neut #  --  4.96 5.28   RBC  --  5.50 5.25   Hgb 13.7 L 14.3 14.0   Hct 44.5 45.2 43.5   Platelet 390 365 377   MCV  --  82.2 82.9   RDW  --  13.9 14.0     FLP:   Recent Labs   Lab 12/01/22  1104 04/03/23  0904 12/04/23  0700   Cholesterol Total 219 H 224 H 142   HDL Cholesterol 45 43 40   LDL Cholesterol 160.00 H 153.00 H 86.00   Triglyceride 71 138 81     DM:   Recent Labs   Lab 08/18/22  1607 04/03/23  0904 12/04/23  0700   Hemoglobin A1c 5.6 5.8 5.6   Creatinine 1.19 H 1.27 H 1.18     Thyroid:   Recent Labs   Lab 12/04/23  0700   TSH 1.346   Thyroxine Free 0.73     Anemia:   Recent Labs   Lab 08/18/22  1607 04/03/23  0904 12/04/23  0700   Hgb  --    < > 14.0   Hct  --    < > 43.5   Iron Level 75  --   --    Ferritin Level 242.61  --  146.42   Transferrin 267  --   --    Iron Binding Capacity Total 304  --   --     < > = values in this interval not displayed.       ASSESSMENT & PLAN:         Health Maintenance Due   Topic Date Due    Colorectal Cancer Screening  Never done    Pneumococcal Vaccines (Age 0-64) (2 - PCV) 12/09/2022      Cervical spine stenosis s/p C5-7 Cervical Fusion Anterior on 3/17/21  Migraines  - MRI lumbar spine in April 2015 showed diffuse spondylitic changes with mild diffuse narrowing of neural foramina, multilevel disc  bulging  - Status post neck surgery in November 2015 at [facility]  - Most recently underwent C5-C7 spine fusion by Dr. Aj, scheduled to undergo L3-L4 fusion with Dr. Aj on 08/16/2021 which was cancelled and being rescheduled for next year, advised to schedule follow up  - ordered PT, MRI Brain  - Advised stop Sumatriptan, Continue with increased dose of Lyrica    Hx of PFO/ chronic chest pain on exertion  - PFO with right to left shunt seen on [facility] in Sept 2015  - DSE was performed on 2/2017 which was negative for ischemia  - 48-Hour Holter Monitor (1/13/21): No significant arrhythmias found  - TTE (1/13/21): EF 55%, repeat pending  - DSE (1/13/21): Negative for ischemia  - Followed by [facility] cardiology last seen on 10/2023 with no plans for PFO closure at this time    Dysphagia -> Describes regurgitation of solid food if not follow by liquids  - Neurology note 11/2022 - cervical myelopathy with radiculopathy with residual dystonia and carpopedal spasm  - Barium Swallow 1/2023 with only mild reflux   - continue protonix 40mg daily    History of CVA with residual left-sided weakness, as reported  - Stable, no new deficits; uses walker and/or cane at home for ambulation.  - Would like to get MRI to quantify extent of lesions, will hold off for now  - Continue aspirin 81mg daily  - LDL increased to now 160, currently on Zettia and Rosuvastatin max dose    Pre-diabetes  - A1C is 5.8% today  - Will continue metformin for now    Essential HTN, well-controlled  - BP today at goal  - Continue current medications Norvasc 2.5mg daily and metoprolol 25mg BID  - Low-sodium diet and exercise discussed    Depression, stable  - will continue home med paroxetine 30 mg daily    Asthma, Mild Persistent  -report hx of childhood asthma  -Prescribed symbicort 160-4.5m patient is compliant  -States he has 4x per month needs albuterol rescue inhaler, sometimes wakes him up at night  - reorder PFTs previously, advised to follow  up  - Ordered prednisone 10 mg PRN 10 day supply    Toenail Fungus  - Referral to Podiatry made  - Will advise on topicals for now    JOEL  - Sleep study ordered    Health Maintenance:  TDAP: 10/15/2020  CAGE screenin/4  HIV screening: Nonreactive  Hepatitis and Syphilis Screen: Negative  Influenza vaccine: UTD  Tdap: given 10/15/2020  colon cancer screening: Still pending, will order FIT in the interim as well as Referral to GI  AAA - never smoker  COVID - UTD  PCV 23 - Given 2022    RTC 3 Months (4 referrals/follow ups made: sleep study, GI for colonoscopy, podiatry, PFT), Answers submitted by the patient for this visit:  Review of Systems Questionnaire (Submitted on 2023)  activity change: No  unexpected weight change: No  rhinorrhea: No  trouble swallowing: No  visual disturbance: No  chest tightness: No  polyuria: No  difficulty urinating: No  joint swelling: No  arthralgias: Yes  confusion: No  dysphoric mood: No      Future Appointments     Future Appointments   Date Time Provider Department Center   2024 10:45 AM Annel Pickett, JOCELINE [facility] CARD [facility]      Orders Placed This Encounter   Procedures    OCCULT BLOOD FECAL IMMUNOASSAY     Standing Status:   Future     Number of Occurrences:   1     Standing Expiration Date:   2025         Discussed with Dr. Kearney - Staff Attestation to Follow    Tu Ramirez MD  [employer] Internal Medicine PGY-2

## 2023-12-19 ENCOUNTER — HOSPITAL ENCOUNTER (OUTPATIENT)
Dept: PULMONOLOGY | Facility: HOSPITAL | Age: 48
Discharge: HOME | End: 2023-12-19
Payer: MEDICAID

## 2023-12-19 DIAGNOSIS — R06.02 SOB (SHORTNESS OF BREATH): ICD-10-CM

## 2023-12-19 LAB
DLCO SINGLE BREATH LLN: 24.14
DLCO SINGLE BREATH PRE REF: 78.1 %
DLCO SINGLE BREATH REF: 31.07
DLCOC SBVA LLN: 3.24
DLCOC SBVA REF: 4.49
DLCOC SINGLE BREATH LLN: 24.14
DLCOC SINGLE BREATH REF: 31.07
DLCOVA LLN: 3.24
DLCOVA PRE REF: 151.1 %
DLCOVA PRE: 6.78 ML/(MIN*MMHG*L) (ref 3.24–5.74)
DLCOVA REF: 4.49
ERV LLN: -16448.65
ERV PRE REF: 93 %
ERV REF: 1.35
FEF 25 75 CHG: 197.9 %
FEF 25 75 LLN: 1.59
FEF 25 75 POST REF: 25.3 %
FEF 25 75 PRE REF: 8.5 %
FEF 25 75 REF: 3.21
FET100 CHG: -0.3 %
FEV1 CHG: 37.7 %
FEV1 FVC CHG: 17.1 %
FEV1 FVC LLN: 70
FEV1 FVC POST REF: 85.5 %
FEV1 FVC PRE REF: 73 %
FEV1 FVC REF: 80
FEV1 LLN: 2.54
FEV1 POST REF: 50.9 %
FEV1 PRE REF: 36.9 %
FEV1 REF: 3.34
FRCPLETH LLN: 2.44
FRCPLETH PREREF: 96.8 %
FRCPLETH REF: 3.43
FVC CHG: 17.6 %
FVC LLN: 3.2
FVC POST REF: 59.3 %
FVC PRE REF: 50.4 %
FVC REF: 4.16
IVC PRE: 2.32 L (ref 3.2–5.13)
IVC SINGLE BREATH LLN: 3.2
IVC SINGLE BREATH PRE REF: 55.9 %
IVC SINGLE BREATH REF: 4.16
MVV LLN: 126
MVV PRE REF: 21.8 %
MVV REF: 148
PEF CHG: -20.2 %
PEF LLN: 6.16
PEF POST REF: 29.7 %
PEF PRE REF: 37.2 %
PEF REF: 8.74
POST FEF 25 75: 0.81 L/S (ref 1.59–5.41)
POST FET 100: 7.56 SEC
POST FEV1 FVC: 68.84 % (ref 70.16–89.33)
POST FEV1: 1.7 L (ref 2.54–4.1)
POST FVC: 2.47 L (ref 3.2–5.13)
POST PEF: 2.59 L/S (ref 6.16–11.32)
PRE DLCO: 24.27 ML/(MIN*MMHG) (ref 24.14–38)
PRE ERV: 1.25 L (ref -16448.65–16451.35)
PRE FEF 25 75: 0.27 L/S (ref 1.59–5.41)
PRE FET 100: 7.58 SEC
PRE FEV1 FVC: 58.77 % (ref 70.16–89.33)
PRE FEV1: 1.23 L (ref 2.54–4.1)
PRE FRC PL: 3.32 L (ref 2.44–4.41)
PRE FVC: 2.1 L (ref 3.2–5.13)
PRE MVV: 32.37 L/MIN (ref 126.21–170.76)
PRE PEF: 3.25 L/S (ref 6.16–11.32)
PRE RV: 2.06 L (ref 1.4–2.75)
PRE TLC: 4.34 L (ref 5.77–8.07)
RAW LLN: 3.06
RAW PRE REF: 66.8 %
RAW PRE: 2.04 CMH2O*S/L (ref 3.06–3.06)
RAW REF: 3.06
RV LLN: 1.4
RV PRE REF: 99.3 %
RV REF: 2.08
RVTLC LLN: 23
RVTLC PRE REF: 149.1 %
RVTLC PRE: 47.56 % (ref 22.92–40.88)
RVTLC REF: 32
TLC LLN: 5.77
TLC PRE REF: 62.7 %
TLC REF: 6.92
VA PRE: 3.58 L (ref 6.77–6.77)
VA SINGLE BREATH LLN: 6.77
VA SINGLE BREATH PRE REF: 52.8 %
VA SINGLE BREATH REF: 6.77
VC LLN: 3.2
VC PRE REF: 54.7 %
VC PRE: 2.27 L (ref 3.2–5.13)
VC REF: 4.16

## 2023-12-19 PROCEDURE — 94729 DIFFUSING CAPACITY: CPT

## 2023-12-19 PROCEDURE — 94726 PLETHYSMOGRAPHY LUNG VOLUMES: CPT

## 2023-12-19 PROCEDURE — 94640 AIRWAY INHALATION TREATMENT: CPT

## 2023-12-19 PROCEDURE — 94060 EVALUATION OF WHEEZING: CPT

## 2024-01-26 ENCOUNTER — TELEPHONE (OUTPATIENT)
Dept: CARDIOLOGY | Facility: CLINIC | Age: 49
End: 2024-01-26
Payer: MEDICAID

## 2024-01-26 NOTE — TELEPHONE ENCOUNTER
Voicemail message left for patient to return call regarding a prior authorization.     A PA was completed for Rep[facility] and approved for 1 year.

## 2024-02-26 ENCOUNTER — OFFICE VISIT (OUTPATIENT)
Dept: CARDIOLOGY | Facility: CLINIC | Age: 49
End: 2024-02-26
Payer: MEDICAID

## 2024-02-26 VITALS
TEMPERATURE: 99 F | HEART RATE: 70 BPM | BODY MASS INDEX: 38.8 KG/M2 | OXYGEN SATURATION: 98 % | RESPIRATION RATE: 18 BRPM | WEIGHT: 261.94 LBS | SYSTOLIC BLOOD PRESSURE: 116 MMHG | HEIGHT: 69 IN | DIASTOLIC BLOOD PRESSURE: 77 MMHG

## 2024-02-26 DIAGNOSIS — E78.5 HYPERLIPIDEMIA LDL GOAL <70: ICD-10-CM

## 2024-02-26 DIAGNOSIS — I10 HYPERTENSION, UNSPECIFIED TYPE: ICD-10-CM

## 2024-02-26 DIAGNOSIS — I10 PRIMARY HYPERTENSION: ICD-10-CM

## 2024-02-26 DIAGNOSIS — R00.2 PALPITATIONS: Primary | ICD-10-CM

## 2024-02-26 DIAGNOSIS — E78.2 MIXED HYPERLIPIDEMIA: ICD-10-CM

## 2024-02-26 DIAGNOSIS — I63.9 CEREBROVASCULAR ACCIDENT (CVA), UNSPECIFIED MECHANISM: ICD-10-CM

## 2024-02-26 PROCEDURE — 3074F SYST BP LT 130 MM HG: CPT | Mod: CPTII,,,

## 2024-02-26 PROCEDURE — 99214 OFFICE O/P EST MOD 30 MIN: CPT | Mod: S$PBB,,,

## 2024-02-26 PROCEDURE — 3078F DIAST BP <80 MM HG: CPT | Mod: CPTII,,,

## 2024-02-26 PROCEDURE — 3008F BODY MASS INDEX DOCD: CPT | Mod: CPTII,,,

## 2024-02-26 PROCEDURE — 99215 OFFICE O/P EST HI 40 MIN: CPT | Mod: PBBFAC

## 2024-02-26 PROCEDURE — 1159F MED LIST DOCD IN RCRD: CPT | Mod: CPTII,,,

## 2024-02-26 PROCEDURE — 1160F RVW MEDS BY RX/DR IN RCRD: CPT | Mod: CPTII,,,

## 2024-02-26 RX ORDER — ALBUTEROL SULFATE 90 UG/1
2 AEROSOL, METERED RESPIRATORY (INHALATION) EVERY 6 HOURS PRN
Qty: 0.103 G | Refills: 3 | OUTPATIENT
Start: 2024-02-26 | End: 2025-02-25

## 2024-02-26 RX ORDER — CETIRIZINE HYDROCHLORIDE 5 MG/1
5 TABLET ORAL DAILY
Qty: 30 TABLET | Refills: 11 | OUTPATIENT
Start: 2024-02-26 | End: 2025-02-25

## 2024-02-26 RX ORDER — ROSUVASTATIN CALCIUM 40 MG/1
40 TABLET, COATED ORAL NIGHTLY
Qty: 30 TABLET | Refills: 4 | OUTPATIENT
Start: 2024-02-26

## 2024-02-26 RX ORDER — PANTOPRAZOLE SODIUM 40 MG/1
40 TABLET, DELAYED RELEASE ORAL DAILY
Qty: 30 TABLET | Refills: 2 | OUTPATIENT
Start: 2024-02-26

## 2024-02-26 RX ORDER — BUDESONIDE AND FORMOTEROL FUMARATE DIHYDRATE 160; 4.5 UG/1; UG/1
2 AEROSOL RESPIRATORY (INHALATION) EVERY 12 HOURS
Qty: 10.2 G | Refills: 1 | OUTPATIENT
Start: 2024-02-26 | End: 2025-02-25

## 2024-02-26 RX ORDER — AMLODIPINE BESYLATE 2.5 MG/1
2.5 TABLET ORAL DAILY
Qty: 90 TABLET | Refills: 3 | OUTPATIENT
Start: 2024-02-26

## 2024-02-26 RX ORDER — EZETIMIBE 10 MG/1
10 TABLET ORAL DAILY
Qty: 90 TABLET | Refills: 3 | OUTPATIENT
Start: 2024-02-26 | End: 2025-02-25

## 2024-02-26 RX ORDER — METOPROLOL TARTRATE 25 MG/1
25 TABLET, FILM COATED ORAL 2 TIMES DAILY
Qty: 180 TABLET | Refills: 3 | OUTPATIENT
Start: 2024-02-26

## 2024-02-26 RX ORDER — ASPIRIN 81 MG/1
81 TABLET ORAL DAILY
Qty: 30 TABLET | Refills: 4 | OUTPATIENT
Start: 2024-02-26

## 2024-02-26 RX ORDER — PREGABALIN 75 MG/1
75 CAPSULE ORAL 3 TIMES DAILY
Qty: 90 CAPSULE | Refills: 6 | OUTPATIENT
Start: 2024-02-26 | End: 2024-09-23

## 2024-02-26 NOTE — PATIENT INSTRUCTIONS
Complete FLP/CMP prior next office visit   Follow up in cardiology clinic in 5 months or sooner if needed  Follow up with PCP as directed

## 2024-02-26 NOTE — PROGRESS NOTES
CHIEF COMPLAINT:   No chief complaint on file.                                                 HPI:  John Jackson 48 y.o. male with a PMH significant for HTN, HLD, diet controlled DM, ?CVA x 2 (no brain MRI at the time), PFO, asthma who presents to cardiology clinic for follow up and results of testing. He reported having a coronary angiogram with Dr. Nate Mae in the past in which he was told he did not have any significant blockage. Patient completed a 48-hour Holter monitor in January 2021 in which no significant arrhythmias were found. Had a 30-day event monitor in October 2022 that did not show any arrhythmias. He completed an echocardiogram in January 2021 which revealed an ejection fraction of 55%. Dobutamine stress echocardiogram completed in January 2021 was negative for ischemia. . Echo and Nuclear Stress completed April 2023. Echo revealed EF 60%, mild PA, mild MR. Nuclear stress test revealed a normal myocardial perfusion scan, no evidence of ischemia or infarction, EF 59% at rest and EF 61% post stress.  At last office visit patient continued to endorse occasional shortness of breath with over exertion but stated that it was stable from prior.  He also endorsed ongoing occasional bilateral lower extremity swelling that he stated was stable.  Otherwise he denied any further cardiac complaints.    Today the patient has similar complaints to last appointment.  He reports shortness of breath that is about the same as last time as well as ongoing bilateral lower extremity swelling with prolonged standing or ambulation.  He reports occasional palpitations that occur occasionally while lying down at night, he states that they are not very bothersome.  Otherwise, patient states that he is feeling well and he denies any chest pain, PND, orthopnea, lightheadedness, dizziness, syncope, or claudication symptoms.  He states that he is able to complete his ADLs without any issues or ischemic symptoms.   Recently, he states that he has been exercising more by walking and he was also cleaned up his diet and he has lost nearly 16 lb since last office visit.  He completed sleep study since last office visit and states that he was diagnosed with sleep apnea.  He states that sleep clinic called him today stating that they had CPAP ready for him.  Encouraged patient to use CPAP nightly for maximum benefit.  He reports compliance with all his current medications.  He denies any tobacco or other illicit drug use.                                                                                                                                                                                                                                                                                                                                                                                                                                 CARDIAC TESTING:  Nuclear Stress - Cardiology Interpreted 4.26.23    Normal myocardial perfusion scan. There is no evidence of myocardial ischemia or infarction.    The gated perfusion images showed an ejection fraction of 59% at rest. The gated perfusion images showed an ejection fraction of 61% post stress.    The patient reported no chest pain during the stress test.    There were no arrhythmias during stress.  On the nuclear stress test the EF is normal.  If the patient has an echo, the EF on the echo is considered more accurate.  The patient has a low risk nuclear stress test.    Echo 3.27.23  · Normal right ventricular size with normal right ventricular systolic function.  · Mild pulmonic regurgitation.  · Mild mitral regurgitation.  · The left ventricle is normal in size with normal systolic function.  · The estimated ejection fraction is 60%.  · Normal left ventricular diastolic function.  · There is no pulmonary hypertension.    Patient Active Problem List   Diagnosis    Fungal toenail infection     Palpitations    Hypertension    Hyperlipidemia LDL goal <70    Cervical myelopathy with cervical radiculopathy    HLD (hyperlipidemia)    SOB (shortness of breath)     Past Surgical History:   Procedure Laterality Date    CERVICAL FUSION      [facility] 2021    CHOLECYSTECTOMY      FUSION OF CERVICAL SPINE BY POSTERIOR APPROACH  2017    [place]    SPINE SURGERY  March, 2021     Social History     Socioeconomic History    Marital status: Single   Occupational History    Occupation: dissabled   Tobacco Use    Smoking status: Never    Smokeless tobacco: Never   Substance and Sexual Activity    Alcohol use: Yes     Alcohol/week: 6.0 standard drinks of alcohol     Types: 4 Glasses of wine, 2 Shots of liquor per week     Comment: glass per night (sm)    Drug use: Not Currently     Types: Marijuana     Comment: 3 puffs twice a day    Sexual activity: Not Currently     Partners: Female     Birth control/protection: None        Family History   Problem Relation Age of Onset    No Known Problems Mother     No Known Problems Father     Diabetes Paternal Grandfather      Review of patient's allergies indicates:   Allergen Reactions    Iodine Nausea And Vomiting     Seafood.    Shellfish containing products Shortness Of Breath         ROS:  Review of Systems   Constitutional: Negative.    HENT: Negative.     Eyes: Negative.    Respiratory:  Positive for shortness of breath (With over exertion). Negative for sputum production.    Cardiovascular:  Positive for palpitations (Occasional at night) and leg swelling (Occasional). Negative for chest pain, orthopnea, claudication and PND.   Gastrointestinal: Negative.  Negative for nausea and vomiting.   Genitourinary: Negative.    Musculoskeletal:  Positive for back pain, falls (Mechanical falls) and joint pain.   Skin: Negative.    Neurological: Negative.    Endo/Heme/Allergies: Negative.    Psychiatric/Behavioral: Negative.                                                                                                                                                                                   Negative except as stated in the history of present illness. See HPI for details.    PHYSICAL EXAM:  There were no vitals taken for this visit.      Physical Exam  Constitutional:       Appearance: Normal appearance. He is obese.   HENT:      Head: Normocephalic.      Nose: Nose normal.      Mouth/Throat:      Mouth: Mucous membranes are moist.   Eyes:      Extraocular Movements: Extraocular movements intact.   Cardiovascular:      Rate and Rhythm: Normal rate and regular rhythm.      Pulses: Normal pulses.      Heart sounds: Normal heart sounds. No murmur heard.  Pulmonary:      Effort: Pulmonary effort is normal. No respiratory distress.      Breath sounds: Normal breath sounds.   Abdominal:      Palpations: Abdomen is soft.   Musculoskeletal:      Cervical back: Normal range of motion.      Right lower leg: No edema.      Left lower leg: No edema.   Skin:     General: Skin is warm and dry.   Neurological:      Mental Status: He is alert and oriented to person, place, and time.         Current Outpatient Medications   Medication Instructions    albuterol (ACCUNEB) 0.63 mg, Inhalation    albuterol (VENTOLIN HFA) 90 mcg/actuation inhaler 2 puffs, Inhalation, Every 6 hours PRN, Rescue    amLODIPine (NORVASC) 2.5 mg, Oral, Daily    aspirin (ECOTRIN) 81 mg, Oral, Daily    budesonide-formoterol 160-4.5 mcg (SYMBICORT) 160-4.5 mcg/actuation HFAA 2 puffs, Inhalation, Every 12 hours, Controller    cetirizine (ZYRTEC) 5 mg, Oral, Daily    ezetimibe (ZETIA) 10 mg, Oral, Daily    metoprolol tartrate (LOPRESSOR) 25 mg, Oral, 2 times daily    nitroGLYCERIN (NITROSTAT) 0.4 mg, Sublingual, Every 5 min PRN    pantoprazole (PROTONIX) 40 mg, Oral, Daily    paroxetine (PAXIL) 30 mg, Oral, Daily    predniSONE (DELTASONE) 10 mg, Oral, As needed (PRN)    pregabalin (LYRICA) 75 mg, Oral, 3 times daily    rosuvastatin  (CRESTOR) 40 mg, Oral, Nightly        All medications, laboratory studies, cardiac diagnostic imaging reviewed.     Lab Results   Component Value Date    LDL 86.00 12/04/2023    .00 (H) 04/03/2023    TRIG 81 12/04/2023    TRIG 138 04/03/2023    CREATININE 1.18 12/04/2023    MG 1.97 01/13/2021    K 4.3 12/04/2023        ASSESSMENT/PLAN:     Edema  - Reports stable, occasional peripheral edema when on feet for extended periods of time.   - Continue with conservative measures with compression stockings and elevation of legs when possible.  - Echo with EF 60%, mild IA/MR, normal RV size and function, normal LV diastolic function (3.27.23)  - Nuclear Stress Test revealed normal myocardial perfusion scan, no evidence of ischemia or infarction, rest EF 59%, post stress EF 61%, no chest pain during test, no arrhythmias during stress test (4.26.23)  - Likely non-cardiac, may consider venous insufficiency US in future if symptoms persist, though no evidence of edema on exam today   - Recommend further evaluation by PCP     HTN   - BP at goal 116/77  - Continue Norvasc   - Counseled on low sodium diet and exercise as tolerated     HLD   - LDL 86 per labs December 2023 - improved from prior   - Continue Crestor and Zetia  - Continue Repatha and is tolerating well   - FLP/CMP prior next office visit  - Counseled on heart healthy, low cholesterol diet and exercise as tolerated      Left-Sided Spacticity/Weakness  - Concern for possible stroke in 2015 b/c of left sided weakness but MRI Brain was never performed. Pt found to have PFO on CHRIS in 2015, so there was a question as to whether he would benefit from PFO closure. Case discussed with Neurology who clarified that patient's left-sided symptoms are due to spasticity from a cervical cord compression in 2015 and is not due to a stroke. So we will not refer him for PFO closure.   - Continue Aspirin, Statin  - Management per PCP/Neurology     DM   - Diet controlled. A1c 5.6%  per labs December 2023  - Management per PCP      JOEL  - Confirmed on sleep study  - Patient states that he will be picking up his CPAP later this week   - Strongly encouraged compliance with CPAP and again educated on the detrimental effects of untreated sleep apnea      Complete FLP/CMP prior next office visit   Follow up in cardiology clinic in 5 months or sooner if needed  Follow up with PCP as directed

## 2024-02-27 DIAGNOSIS — I63.9 CEREBROVASCULAR ACCIDENT (CVA), UNSPECIFIED MECHANISM: ICD-10-CM

## 2024-02-27 DIAGNOSIS — E78.5 HYPERLIPIDEMIA LDL GOAL <70: Primary | ICD-10-CM

## 2024-02-27 RX ORDER — EVOLOCUMAB 140 MG/ML
140 INJECTION, SOLUTION SUBCUTANEOUS
Qty: 2 ML | Refills: 0 | Status: SHIPPED | OUTPATIENT
Start: 2024-02-27 | End: 2024-03-27 | Stop reason: SDUPTHER

## 2024-02-27 NOTE — TELEPHONE ENCOUNTER
----- Message from Annel Pickett PA-C sent at 2/26/2024  3:47 PM CST -----  Good Afternoon :)    Mr. Lopez was previously on Repatha and was most recently re-approved for the medication after a PA was completed. The med fell off of his list. Do I need to re-send to pharmacy or is there a way that we can tell if he has refills that he is just unaware of? Thank you in advance!!    Annel

## 2024-03-27 DIAGNOSIS — I63.9 CEREBROVASCULAR ACCIDENT (CVA), UNSPECIFIED MECHANISM: ICD-10-CM

## 2024-03-27 DIAGNOSIS — E78.5 HYPERLIPIDEMIA LDL GOAL <70: ICD-10-CM

## 2024-03-27 DIAGNOSIS — I10 HYPERTENSION, UNSPECIFIED TYPE: ICD-10-CM

## 2024-03-27 RX ORDER — AMLODIPINE BESYLATE 2.5 MG/1
2.5 TABLET ORAL DAILY
Qty: 90 TABLET | Refills: 3 | OUTPATIENT
Start: 2024-03-27

## 2024-03-27 RX ORDER — METOPROLOL TARTRATE 25 MG/1
25 TABLET, FILM COATED ORAL 2 TIMES DAILY
Qty: 180 TABLET | Refills: 3 | OUTPATIENT
Start: 2024-03-27

## 2024-03-27 RX ORDER — ASPIRIN 81 MG/1
81 TABLET ORAL DAILY
Qty: 30 TABLET | Refills: 4 | OUTPATIENT
Start: 2024-03-27

## 2024-03-27 RX ORDER — METOPROLOL TARTRATE 25 MG/1
25 TABLET, FILM COATED ORAL 2 TIMES DAILY
Qty: 180 TABLET | Refills: 3 | Status: SHIPPED | OUTPATIENT
Start: 2024-03-27

## 2024-03-27 RX ORDER — ALBUTEROL SULFATE 90 UG/1
2 AEROSOL, METERED RESPIRATORY (INHALATION) EVERY 6 HOURS PRN
Qty: 0.103 G | Refills: 3 | OUTPATIENT
Start: 2024-03-27 | End: 2025-03-27

## 2024-03-27 RX ORDER — BUDESONIDE AND FORMOTEROL FUMARATE DIHYDRATE 160; 4.5 UG/1; UG/1
2 AEROSOL RESPIRATORY (INHALATION) EVERY 12 HOURS
Qty: 10.2 G | Refills: 1 | OUTPATIENT
Start: 2024-03-27 | End: 2025-03-27

## 2024-03-27 RX ORDER — PAROXETINE 30 MG/1
30 TABLET, FILM COATED ORAL DAILY
Qty: 30 TABLET | Refills: 2 | OUTPATIENT
Start: 2024-03-27

## 2024-03-27 RX ORDER — ROSUVASTATIN CALCIUM 40 MG/1
40 TABLET, COATED ORAL NIGHTLY
Qty: 90 TABLET | Refills: 3 | Status: SHIPPED | OUTPATIENT
Start: 2024-03-27

## 2024-03-27 RX ORDER — EVOLOCUMAB 140 MG/ML
140 INJECTION, SOLUTION SUBCUTANEOUS
Qty: 2 ML | Refills: 0 | Status: SHIPPED | OUTPATIENT
Start: 2024-03-27 | End: 2024-04-08 | Stop reason: SDUPTHER

## 2024-03-27 RX ORDER — EZETIMIBE 10 MG/1
10 TABLET ORAL DAILY
Qty: 90 TABLET | Refills: 3 | OUTPATIENT
Start: 2024-03-27 | End: 2025-03-27

## 2024-03-27 RX ORDER — PANTOPRAZOLE SODIUM 40 MG/1
40 TABLET, DELAYED RELEASE ORAL DAILY
Qty: 30 TABLET | Refills: 2 | OUTPATIENT
Start: 2024-03-27

## 2024-03-27 RX ORDER — ASPIRIN 81 MG/1
81 TABLET ORAL DAILY
Qty: 90 TABLET | Refills: 3 | Status: CANCELLED | OUTPATIENT
Start: 2024-03-27

## 2024-03-27 RX ORDER — AMLODIPINE BESYLATE 2.5 MG/1
2.5 TABLET ORAL DAILY
Qty: 90 TABLET | Refills: 3 | Status: CANCELLED | OUTPATIENT
Start: 2024-03-27

## 2024-03-27 RX ORDER — ROSUVASTATIN CALCIUM 40 MG/1
40 TABLET, COATED ORAL NIGHTLY
Qty: 30 TABLET | Refills: 4 | OUTPATIENT
Start: 2024-03-27

## 2024-03-27 RX ORDER — PREGABALIN 75 MG/1
75 CAPSULE ORAL 3 TIMES DAILY
Qty: 90 CAPSULE | Refills: 6 | OUTPATIENT
Start: 2024-03-27 | End: 2024-10-23

## 2024-03-27 RX ORDER — CETIRIZINE HYDROCHLORIDE 5 MG/1
5 TABLET ORAL DAILY
Qty: 30 TABLET | Refills: 11 | OUTPATIENT
Start: 2024-03-27 | End: 2025-03-27

## 2024-03-27 RX ORDER — EZETIMIBE 10 MG/1
10 TABLET ORAL DAILY
Qty: 90 TABLET | Refills: 3 | Status: SHIPPED | OUTPATIENT
Start: 2024-03-27 | End: 2025-03-27

## 2024-03-28 ENCOUNTER — LAB VISIT (OUTPATIENT)
Dept: LAB | Facility: HOSPITAL | Age: 49
End: 2024-03-28
Payer: MEDICAID

## 2024-03-28 ENCOUNTER — OFFICE VISIT (OUTPATIENT)
Dept: INTERNAL MEDICINE | Facility: CLINIC | Age: 49
End: 2024-03-28
Payer: MEDICAID

## 2024-03-28 VITALS
HEIGHT: 69 IN | RESPIRATION RATE: 18 BRPM | WEIGHT: 264.13 LBS | BODY MASS INDEX: 39.12 KG/M2 | HEART RATE: 62 BPM | OXYGEN SATURATION: 100 % | DIASTOLIC BLOOD PRESSURE: 73 MMHG | SYSTOLIC BLOOD PRESSURE: 110 MMHG | TEMPERATURE: 98 F

## 2024-03-28 DIAGNOSIS — Z12.5 PROSTATE CANCER SCREENING: ICD-10-CM

## 2024-03-28 DIAGNOSIS — I10 HYPERTENSION, UNSPECIFIED TYPE: ICD-10-CM

## 2024-03-28 DIAGNOSIS — Z12.11 SPECIAL SCREENING FOR MALIGNANT NEOPLASMS, COLON: ICD-10-CM

## 2024-03-28 DIAGNOSIS — E78.2 MIXED HYPERLIPIDEMIA: ICD-10-CM

## 2024-03-28 DIAGNOSIS — E78.5 HYPERLIPIDEMIA LDL GOAL <70: Primary | ICD-10-CM

## 2024-03-28 DIAGNOSIS — I63.9 CEREBROVASCULAR ACCIDENT (CVA), UNSPECIFIED MECHANISM: ICD-10-CM

## 2024-03-28 DIAGNOSIS — R07.9 CHEST PAIN, UNSPECIFIED TYPE: ICD-10-CM

## 2024-03-28 LAB
CHOLEST SERPL-MCNC: 147 MG/DL
CHOLEST/HDLC SERPL: 3 {RATIO} (ref 0–5)
HDLC SERPL-MCNC: 44 MG/DL (ref 35–60)
LDLC SERPL CALC-MCNC: 82 MG/DL (ref 50–140)
PSA SERPL-MCNC: 0.32 NG/ML
TRIGL SERPL-MCNC: 104 MG/DL (ref 34–140)
VLDLC SERPL CALC-MCNC: 21 MG/DL

## 2024-03-28 PROCEDURE — 84153 ASSAY OF PSA TOTAL: CPT

## 2024-03-28 PROCEDURE — 80061 LIPID PANEL: CPT

## 2024-03-28 PROCEDURE — 99214 OFFICE O/P EST MOD 30 MIN: CPT | Mod: PBBFAC

## 2024-03-28 PROCEDURE — 36415 COLL VENOUS BLD VENIPUNCTURE: CPT

## 2024-03-28 RX ORDER — KETOCONAZOLE 20 MG/G
CREAM TOPICAL 2 TIMES DAILY
COMMUNITY
Start: 2024-01-25

## 2024-03-28 RX ORDER — EZETIMIBE 10 MG/1
10 TABLET ORAL DAILY
Qty: 90 TABLET | Refills: 3 | Status: SHIPPED | OUTPATIENT
Start: 2024-03-28 | End: 2025-03-28

## 2024-03-28 RX ORDER — ALBUTEROL SULFATE 90 UG/1
2 AEROSOL, METERED RESPIRATORY (INHALATION) EVERY 6 HOURS PRN
Qty: 0.103 G | Refills: 3 | Status: SHIPPED | OUTPATIENT
Start: 2024-03-28 | End: 2025-03-28

## 2024-03-28 RX ORDER — PANTOPRAZOLE SODIUM 40 MG/1
40 TABLET, DELAYED RELEASE ORAL DAILY
Qty: 30 TABLET | Refills: 2 | Status: SHIPPED | OUTPATIENT
Start: 2024-03-28

## 2024-03-28 RX ORDER — ASPIRIN 81 MG/1
81 TABLET ORAL DAILY
Qty: 30 TABLET | Refills: 4 | Status: SHIPPED | OUTPATIENT
Start: 2024-03-28

## 2024-03-28 RX ORDER — NITROGLYCERIN 0.4 MG/1
0.4 TABLET SUBLINGUAL EVERY 5 MIN PRN
Qty: 25 TABLET | Refills: 3 | Status: SHIPPED | OUTPATIENT
Start: 2024-03-28

## 2024-03-28 RX ORDER — AMLODIPINE BESYLATE 2.5 MG/1
2.5 TABLET ORAL DAILY
Qty: 90 TABLET | Refills: 3 | Status: SHIPPED | OUTPATIENT
Start: 2024-03-28

## 2024-03-28 RX ORDER — METOPROLOL TARTRATE 25 MG/1
25 TABLET, FILM COATED ORAL 2 TIMES DAILY
Qty: 180 TABLET | Refills: 3 | Status: SHIPPED | OUTPATIENT
Start: 2024-03-28

## 2024-03-28 RX ORDER — ROSUVASTATIN CALCIUM 40 MG/1
40 TABLET, COATED ORAL NIGHTLY
Qty: 30 TABLET | Refills: 4 | Status: SHIPPED | OUTPATIENT
Start: 2024-03-28

## 2024-03-28 RX ORDER — CICLOPIROX 80 MG/ML
SOLUTION TOPICAL DAILY
COMMUNITY
Start: 2024-03-21

## 2024-03-28 RX ORDER — BUDESONIDE AND FORMOTEROL FUMARATE DIHYDRATE 160; 4.5 UG/1; UG/1
2 AEROSOL RESPIRATORY (INHALATION) EVERY 12 HOURS
Qty: 10.2 G | Refills: 1 | Status: SHIPPED | OUTPATIENT
Start: 2024-03-28 | End: 2024-04-30 | Stop reason: SDUPTHER

## 2024-03-28 RX ORDER — PAROXETINE 30 MG/1
30 TABLET, FILM COATED ORAL DAILY
Qty: 30 TABLET | Refills: 2 | Status: SHIPPED | OUTPATIENT
Start: 2024-03-28

## 2024-03-28 RX ORDER — NYSTATIN 100000 [USP'U]/G
POWDER TOPICAL 2 TIMES DAILY
COMMUNITY
Start: 2023-12-20

## 2024-03-28 RX ORDER — SUMATRIPTAN SUCCINATE 25 MG/1
25 TABLET ORAL
COMMUNITY

## 2024-03-28 RX ORDER — CETIRIZINE HYDROCHLORIDE 5 MG/1
5 TABLET ORAL DAILY
Qty: 30 TABLET | Refills: 11 | Status: SHIPPED | OUTPATIENT
Start: 2024-03-28 | End: 2025-03-28

## 2024-03-28 NOTE — PROGRESS NOTES
Attending Addendum:   Patient seen and examined in clinic. Management and Plan were discussed with resident. Care was reasonable and necessary.   Ana Pruett MD  [facility]

## 2024-03-28 NOTE — PROGRESS NOTES
[facility]  CLINIC NOTE    Patient Name: John Jackson  YOB: 1975    PRESENTING HISTORY       History of Present Illness:  Mr. John Jackson is a 48 y.o. male  With PMHx: HTN, HLD, CVA (2015 w/ L sided weakness), PFO, chronic low back and neck pain, hx of gastric ulcer    This is a 46 y.o AAM who presents to clinic as a routine f/u. Denies any fever, chills, chest pain, palpitations, nausea, vomiting, abdominal pain, lower extremity swelling, weakness, dizziness, syncope Patient underwent C5-C7 anterior spine fusion by Dr. Aj on 3/17/21 and was scheduled to have L3-L4 fusion with Dr. Aj on 08/16/2021, but had to cancel due to his wife needing surgery.    Interval Hx: Reports difficulty swallowing solids, no difficulty swallowing liquids. He does endorse intermittent symptoms of pain with swallowing and states the he feels the sensation of food getting stuck in his throat but never vomits. He has also lost 6 ibs since Sept 2021 until now. He reports these symptoms all started a little before August 2021 but this is the first time patient has ever mentioned anything to me. Appetite has lessened some. He denies any abdominal pain, night sweats, fever/chills. He is taking omeprazole 20mg daily which has been on chronically. I ordered lab work for today's visit which he had done in [place] but does not have records sent here yet. Will try to obtain.     Was seen by Neurology, symptoms likely due to cervical myelopathy with carpopedal spasms and dystonic toes. Spasticity not due to stroke. PFO closure not necessary at this time, and was seen by Cardiology. LDL not at goal this time. Patient today complains of congestion, itchy throat, some mild SOB no wheezing. He states it has been going on for three days. Additionally still has dysphagia complaints, needs fluids to figueroa down his food otherwise solids comeback up. Not necessarily vomiting.    Flow Esophgram 1/2023  was negative.  Patient has been complaining of worsening headaches, as well as continued falls.  He states that his headaches begin either occipitally or in his right temporal area and spread associated with auras (visual) and lights. He states that it last for 1 to 3 days, totalling greater than 15 days in a month.     LOV: States he has shortness of breath that previously had been prevented with Symbicort. Headaches are still present however not a chief complaint. Lyrica doesn't help as much with pain as current dosage. Still has follow up for multiple specialties. See below in plan.    Today: PFT done showing moderate obstruction with good response to bronchodilators. Otherwise has continued headaches. Neck Pain. HE has lost weight on his diet. BP is good. Advised to continue CPAP.      CURRENT MEDICATIONS      Current Outpatient Medications on File Prior to Visit   Medication Sig    albuterol (VENTOLIN HFA) 90 mcg/actuation inhaler Inhale 2 puffs into the lungs every 6 (six) hours as needed for Wheezing. Rescue    amLODIPine (NORVASC) 2.5 MG tablet Take 1 tablet (2.5 mg total) by mouth once daily.    aspirin (ECOTRIN) 81 MG EC tablet Take 1 tablet (81 mg total) by mouth once daily.    budesonide-formoterol 160-4.5 mcg (SYMBICORT) 160-4.5 mcg/actuation HFAA Inhale 2 puffs into the lungs every 12 (twelve) hours. Controller    cetirizine (ZYRTEC) 5 MG tablet Take 1 tablet (5 mg total) by mouth once daily.    ciclopirox (PENLAC) 8 % Soln Apply topically Daily.    evolocumab (REPATHA SURECLICK) 140 mg/mL PnIj Inject 1 mL (140 mg total) into the skin every 14 (fourteen) days.    ezetimibe (ZETIA) 10 mg tablet Take 1 tablet (10 mg total) by mouth once daily.    ketoconazole (NIZORAL) 2 % cream Apply topically 2 (two) times daily.    metoprolol tartrate (LOPRESSOR) 25 MG tablet Take 1 tablet (25 mg total) by mouth 2 (two) times daily.    nitroGLYCERIN (NITROSTAT) 0.4 MG SL tablet Place 1 tablet (0.4 mg total) under the  tongue every 5 (five) minutes as needed for Chest pain.    NYSTOP powder Apply topically 2 (two) times daily.    pantoprazole (PROTONIX) 40 MG tablet Take 1 tablet (40 mg total) by mouth once daily.    paroxetine (PAXIL) 30 MG tablet Take 1 tablet (30 mg total) by mouth once daily.    predniSONE (DELTASONE) 10 MG tablet Take 1 tablet (10 mg total) by mouth as needed (For SOB with Asthma).    pregabalin (LYRICA) 75 MG capsule Take 1 capsule (75 mg total) by mouth 3 (three) times daily.    rosuvastatin (CRESTOR) 40 MG Tab Take 1 tablet (40 mg total) by mouth every evening.    sumatriptan (IMITREX) 25 MG Tab Take 25 mg by mouth.     No current facility-administered medications on file prior to visit.         Review of Systems   HENT:  Negative for hearing loss.    Eyes:  Negative for discharge.   Respiratory:  Negative for wheezing.    Cardiovascular:  Negative for chest pain and palpitations.   Gastrointestinal:  Positive for constipation. Negative for blood in stool, diarrhea and vomiting.   Genitourinary:  Positive for urgency. Negative for hematuria.   Musculoskeletal:  Positive for neck pain.   Neurological:  Positive for weakness and headaches.   Endo/Heme/Allergies:  Positive for polydipsia.       PAST HISTORY:     Past Medical History:   Diagnosis Date    Diabetes mellitus, type 2     Hyperlipidemia     Hypertension        Past Surgical History:   Procedure Laterality Date    CERVICAL FUSION      [facility] Dr[facility] 2021    CHOLECYSTECTOMY      FUSION OF CERVICAL SPINE BY POSTERIOR APPROACH  2017    [place]    SPINE SURGERY  March, 2021       Family History   Problem Relation Age of Onset    No Known Problems Mother     No Known Problems Father     Diabetes Paternal Grandfather        Social History     Socioeconomic History    Marital status: Single   Occupational History    Occupation: dissabled   Tobacco Use    Smoking status: Never    Smokeless tobacco: Never   Substance and Sexual Activity    Alcohol use:  Yes     Alcohol/week: 6.0 standard drinks of alcohol     Types: 4 Glasses of wine, 2 Shots of liquor per week     Comment: glass per night (sm)    Drug use: Not Currently     Types: Marijuana     Comment: 3 puffs twice a day    Sexual activity: Not Currently     Partners: Female     Birth control/protection: None     Social Determinants of Health     Financial Resource Strain: Medium Risk (3/27/2024)    Overall Financial Resource Strain (CARDIA)     Difficulty of Paying Living Expenses: Somewhat hard   Food Insecurity: Food Insecurity Present (3/27/2024)    Hunger Vital Sign     Worried About Running Out of Food in the Last Year: Sometimes true     Ran Out of Food in the Last Year: Sometimes true   Transportation Needs: No Transportation Needs (3/27/2024)    PRAPARE - Transportation     Lack of Transportation (Medical): No     Lack of Transportation (Non-Medical): No   Physical Activity: Insufficiently Active (3/27/2024)    Exercise Vital Sign     Days of Exercise per Week: 3 days     Minutes of Exercise per Session: 10 min   Stress: No Stress Concern Present (3/27/2024)    [employer] [place] [employer] - Occupational Stress Questionnaire     Feeling of Stress : Only a little   Social Connections: Unknown (3/27/2024)    Social Connection and Isolation Panel [NHANES]     Frequency of Communication with Friends and Family: Three times a week     Frequency of Social Gatherings with Friends and Family: Once a week     Active Member of Clubs or Organizations: No     Attends Club or Organization Meetings: Never     Marital Status: Living with partner   Housing Stability: Low Risk  (3/27/2024)    Housing Stability Vital Sign     Unable to Pay for Housing in the Last Year: No     Number of Places Lived in the Last Year: 1     Unstable Housing in the Last Year: No       Review of patient's allergies indicates:   Allergen Reactions    Iodine Nausea And Vomiting     Seafood.    Shellfish containing products  "Shortness Of Breath       OBJECTIVE:   Vital Signs:  Vitals:    03/28/24 0758   BP: 110/73   Pulse: 62   Resp: 18   Temp: 98.3 °F (36.8 °C)   TempSrc: Oral   SpO2: 100%   Weight: 119.8 kg (264 lb 1.8 oz)   Height: 5' 9" (1.753 m)         No results found for this or any previous visit (from the past 24 hour(s)).        Physical Exam  Constitutional:       Appearance: He is obese.   HENT:      Head: Normocephalic.      Nose: Congestion and rhinorrhea present.   Eyes:      General: Visual field deficit present.      Extraocular Movements: Extraocular movements intact.      Pupils: Pupils are equal, round, and reactive to light.   Cardiovascular:      Rate and Rhythm: Normal rate and regular rhythm.      Pulses: Normal pulses.      Heart sounds: Normal heart sounds.   Pulmonary:      Effort: Pulmonary effort is normal.      Breath sounds: Normal breath sounds.   Musculoskeletal:      Cervical back: Rigidity and tenderness present.   Neurological:      Mental Status: He is alert.      Cranial Nerves: Cranial nerve deficit present.      Motor: Weakness and abnormal muscle tone present. No tremor.      Coordination: Romberg sign positive. Finger-Nose-Finger Test abnormal.      Comments: Left Hand strength 3+/5 vs 5/5 RH    Movement Exam:    Hypophonic, Hypomimia  No postural or rest tremors  EOM slow, not   FTN without tremor  Hypokinesia in Bilateral Upper extremities on finger taps, decrement more apparent on right, left hypokinetic  Increased tone on left, spastic     Psychiatric:         Mood and Affect: Mood normal.         Behavior: Behavior normal.       Laboratory  CMP:   Recent Labs   Lab 08/18/22  1607 04/03/23  0904 12/04/23  0700   Sodium Level 140 142 143   Potassium Level 4.3 4.7 4.3   Chloride 103 106 108 H   Carbon Dioxide 29 28 29   Blood Urea Nitrogen 12.9 13.2 13.7   Creatinine 1.19 H 1.27 H 1.18   Glucose Level 87 109 H 112 H   Calcium Level Total 9.6 10.0 9.4   Albumin Level 4.1 4.0 3.7   Bilirubin " Total 0.5 0.4 0.4   Aspartate Aminotransferase 25 24 25   Alanine Aminotransferase 30 29 29   Alkaline Phosphatase 73 64 67       CBC:   Recent Labs   Lab 04/03/23  0904 12/04/23  0700   WBC 8.5 9.25   Neut # 4.96 5.28   RBC 5.50 5.25   Hgb 14.3 14.0   Hct 45.2 43.5   Platelet 365 377   MCV 82.2 82.9   RDW 13.9 14.0       FLP:   Recent Labs   Lab 12/01/22  1104 04/03/23  0904 12/04/23  0700   Cholesterol Total 219 H 224 H 142   HDL Cholesterol 45 43 40   LDL Cholesterol 160.00 H 153.00 H 86.00   Triglyceride 71 138 81       DM:   Recent Labs   Lab 08/18/22  1607 04/03/23  0904 12/04/23  0700   Hemoglobin A1c 5.6 5.8 5.6   Creatinine 1.19 H 1.27 H 1.18       Thyroid:   Recent Labs   Lab 12/04/23  0700   TSH 1.346   Thyroxine Free 0.73       Anemia:   Recent Labs   Lab 08/18/22  1607 04/03/23  0904 12/04/23  0700   Hgb  --    < > 14.0   Hct  --    < > 43.5   Iron Level 75  --   --    Ferritin Level 242.61  --  146.42   Transferrin 267  --   --    Iron Binding Capacity Total 304  --   --     < > = values in this interval not displayed.         ASSESSMENT & PLAN:         Health Maintenance Due   Topic Date Due    Colorectal Cancer Screening  Never done      Cervical spine stenosis s/p C5-7 Cervical Fusion Anterior on 3/17/21  Migraines  - MRI lumbar spine in April 2015 showed diffuse spondylitic changes with mild diffuse narrowing of neural foramina, multilevel disc bulging  - Status post neck surgery in November 2015 at [facility]  - Most recently underwent C5-C7 spine fusion by Dr. Aj, scheduled to undergo L3-L4 fusion with Dr. Aj on 08/16/2021 which was cancelled and being rescheduled for next year, advised to schedule follow up  - ordered PT, MRI Brain  - Advised stop Sumatriptan, Continue with increased dose of Lyrica    Hx of PFO/ chronic chest pain on exertion  - PFO with right to left shunt seen on [facility] in Sept 2015  - DSE was performed on 2/2017 which was negative for ischemia  - 48-Hour Holter  Monitor (21): No significant arrhythmias found  - TTE (21): EF 55%, repeat pending  - DSE (21): Negative for ischemia  - Followed by [facility] cardiology last seen on 10/2023 with no plans for PFO closure at this time    Dysphagia -> Describes regurgitation of solid food if not follow by liquids  - Neurology note 2022 - cervical myelopathy with radiculopathy with residual dystonia and carpopedal spasm  - Barium Swallow 2023 with only mild reflux   - continue protonix 40mg daily    History of CVA with residual left-sided weakness, as reported  - Stable, no new deficits; uses walker and/or cane at home for ambulation.  - Would like to get MRI to quantify extent of lesions, will hold off for now  - Continue aspirin 81mg daily  - LDL increased to now 160, currently on Zettia and Rosuvastatin max dose    Pre-diabetes  - A1C is 5.6% today  - Will continue metformin for now    Essential HTN, well-controlled  - BP today at goal  - Continue current medications Norvasc 2.5mg daily and metoprolol 25mg BID  - Low-sodium diet and exercise discussed    Depression, stable  - will continue home med paroxetine 30 mg daily    Asthma, Mild Persistent  -report hx of childhood asthma  -Prescribed symbicort 160-4.5m patient is compliant  -States he has 4x per month needs albuterol rescue inhaler, sometimes wakes him up at night  -PFTs done showed moderate obstruction with appr  -Ordered prednisone 10 mg PRN 10 day supply    Toenail Fungus  - Referral to Podiatry made  - Will advise on topicals for now    JOEL  - Sleep study done, advised on continuing CPAP    Health Maintenance:  TDAP: 10/15/2020  CAGE screenin  HIV screening: Nonreactive  Hepatitis and Syphilis Screen: Negative  Influenza vaccine: UTD  Tdap: given 10/15/2020  colon cancer screening: Still pending, will order FIT in the interim as well as Referral to GI  AAA - never smoker  COVID - UTD  PCV 23 - Given 2022    RTC 6 months, GI referral  again      Future Appointments     Future Appointments   Date Time Provider Department Center   7/26/2024  1:15 PM Annel Pickett PA-C [facility] CARD [place]      No orders of the defined types were placed in this encounter.        Discussed with Dr. Covarrubias - Staff Attestation to Follow    Tu Ramirez MD  [facility] Internal Medicine PGY-2    Answers submitted by the patient for this visit:  Review of Systems Questionnaire (Submitted on 3/27/2024)  activity change: No  unexpected weight change: No  rhinorrhea: No  trouble swallowing: No  visual disturbance: No  chest tightness: No  polyuria: No  difficulty urinating: No  joint swelling: No  arthralgias: Yes  confusion: No  dysphoric mood: No

## 2024-04-08 DIAGNOSIS — I63.9 CEREBROVASCULAR ACCIDENT (CVA), UNSPECIFIED MECHANISM: ICD-10-CM

## 2024-04-08 DIAGNOSIS — E78.5 HYPERLIPIDEMIA LDL GOAL <70: ICD-10-CM

## 2024-04-09 RX ORDER — EVOLOCUMAB 140 MG/ML
140 INJECTION, SOLUTION SUBCUTANEOUS
Qty: 2 ML | Refills: 0 | Status: SHIPPED | OUTPATIENT
Start: 2024-04-09 | End: 2024-05-07

## 2024-04-26 DIAGNOSIS — R13.12 DYSPHAGIA, OROPHARYNGEAL PHASE: ICD-10-CM

## 2024-04-26 DIAGNOSIS — R11.2 NAUSEA WITH VOMITING: ICD-10-CM

## 2024-04-26 DIAGNOSIS — K59.00 COLONIC CONSTIPATION: Primary | ICD-10-CM

## 2024-04-30 DIAGNOSIS — J45.31 MILD PERSISTENT ASTHMA WITH ACUTE EXACERBATION: Primary | ICD-10-CM

## 2024-04-30 DIAGNOSIS — J45.21 MILD INTERMITTENT ASTHMA WITH ACUTE EXACERBATION: ICD-10-CM

## 2024-04-30 RX ORDER — CETIRIZINE HYDROCHLORIDE 5 MG/1
5 TABLET ORAL DAILY
Qty: 30 TABLET | Refills: 11 | Status: CANCELLED | OUTPATIENT
Start: 2024-04-30 | End: 2025-04-30

## 2024-04-30 RX ORDER — BUDESONIDE AND FORMOTEROL FUMARATE DIHYDRATE 160; 4.5 UG/1; UG/1
2 AEROSOL RESPIRATORY (INHALATION) EVERY 12 HOURS
Qty: 10.2 G | Refills: 1 | Status: SHIPPED | OUTPATIENT
Start: 2024-04-30 | End: 2025-04-30

## 2024-04-30 RX ORDER — ALBUTEROL SULFATE 90 UG/1
2 AEROSOL, METERED RESPIRATORY (INHALATION) EVERY 6 HOURS PRN
Qty: 0.103 G | Refills: 3 | Status: CANCELLED | OUTPATIENT
Start: 2024-04-30 | End: 2025-04-30

## 2024-05-08 ENCOUNTER — HOSPITAL ENCOUNTER (OUTPATIENT)
Dept: RADIOLOGY | Facility: HOSPITAL | Age: 49
Discharge: HOME | End: 2024-05-08
Payer: MEDICAID

## 2024-05-08 DIAGNOSIS — R13.12 DYSPHAGIA, OROPHARYNGEAL PHASE: ICD-10-CM

## 2024-05-08 DIAGNOSIS — R11.2 NAUSEA WITH VOMITING: ICD-10-CM

## 2024-05-08 DIAGNOSIS — K59.00 COLONIC CONSTIPATION: ICD-10-CM

## 2024-05-08 PROCEDURE — A9541 TC99M SULFUR COLLOID: HCPCS

## 2024-05-08 PROCEDURE — 78264 GASTRIC EMPTYING IMG STUDY: CPT | Mod: TC

## 2024-05-08 RX ORDER — TECHNETIUM TC 99M SULFUR COLLOID 2 MG
2 KIT MISCELLANEOUS
Status: COMPLETED | OUTPATIENT
Start: 2024-05-08 | End: 2024-05-08

## 2024-05-08 RX ADMIN — TECHNETIUM TC 99M SULFUR COLLOID KIT 2 MILLICURIE: KIT at 07:05

## 2024-06-05 RX ORDER — PREGABALIN 75 MG/1
75 CAPSULE ORAL 3 TIMES DAILY
Qty: 90 CAPSULE | Refills: 6 | Status: SHIPPED | OUTPATIENT
Start: 2024-06-05 | End: 2025-01-01

## 2024-06-26 ENCOUNTER — DOCUMENTATION ONLY (OUTPATIENT)
Dept: INTERNAL MEDICINE | Facility: CLINIC | Age: 49
End: 2024-06-26
Payer: MEDICAID

## 2024-06-26 LAB — CRC RECOMMENDATION EXT: NORMAL

## 2024-07-26 ENCOUNTER — OFFICE VISIT (OUTPATIENT)
Dept: CARDIOLOGY | Facility: CLINIC | Age: 49
End: 2024-07-26
Payer: MEDICAID

## 2024-07-26 VITALS
TEMPERATURE: 99 F | SYSTOLIC BLOOD PRESSURE: 121 MMHG | WEIGHT: 257.25 LBS | HEART RATE: 64 BPM | RESPIRATION RATE: 18 BRPM | DIASTOLIC BLOOD PRESSURE: 84 MMHG | OXYGEN SATURATION: 97 % | BODY MASS INDEX: 38.1 KG/M2 | HEIGHT: 69 IN

## 2024-07-26 DIAGNOSIS — E78.2 MIXED HYPERLIPIDEMIA: ICD-10-CM

## 2024-07-26 DIAGNOSIS — I10 PRIMARY HYPERTENSION: ICD-10-CM

## 2024-07-26 DIAGNOSIS — R00.2 PALPITATIONS: Primary | ICD-10-CM

## 2024-07-26 DIAGNOSIS — E78.5 HYPERLIPIDEMIA LDL GOAL <70: ICD-10-CM

## 2024-07-26 PROCEDURE — 99215 OFFICE O/P EST HI 40 MIN: CPT | Mod: PBBFAC

## 2024-07-26 RX ORDER — ONDANSETRON 4 MG/1
4 TABLET, ORALLY DISINTEGRATING ORAL
Qty: 30 TABLET | Refills: 1 | Status: SHIPPED | OUTPATIENT
Start: 2024-07-26

## 2024-07-26 RX ORDER — ONDANSETRON 4 MG/1
4 TABLET, ORALLY DISINTEGRATING ORAL
Qty: 30 TABLET | Refills: 1 | Status: SHIPPED | OUTPATIENT
Start: 2024-07-26 | End: 2024-07-26

## 2024-07-26 RX ORDER — EVOLOCUMAB 140 MG/ML
140 INJECTION, SOLUTION SUBCUTANEOUS
Qty: 2 ML | Refills: 11 | Status: SHIPPED | OUTPATIENT
Start: 2024-07-26 | End: 2025-07-26

## 2024-07-26 NOTE — PROGRESS NOTES
"CHIEF COMPLAINT:   Chief Complaint   Patient presents with    Follow-up     5 mos f/u palp on occ                                                  HPI:  John Jackson 48 y.o. male with a PMH significant for HTN, HLD, diet controlled DM, ?CVA x 2 (no brain MRI at the time), PFO, asthma who presents to cardiology clinic for follow up and results of testing. He reported having a coronary angiogram with Dr. Nate Mae in the past in which he was told he did not have any significant blockage. Patient completed a 48-hour Holter monitor in January 2021 in which no significant arrhythmias were found. Had a 30-day event monitor in October 2022 that did not show any arrhythmias. He completed an echocardiogram in January 2021 which revealed an ejection fraction of 55%. Dobutamine stress echocardiogram completed in January 2021 was negative for ischemia. . Echo and Nuclear Stress completed April 2023. Echo revealed EF 60%, mild NH, mild MR. Nuclear stress test revealed a normal myocardial perfusion scan, no evidence of ischemia or infarction, EF 59% at rest and EF 61% post stress.  At last office visit patient stated that he was feeling well from a cardiac standpoint and stated that things were "about the same as before".     Today the patient states that he feels well overall.  He continues to have occasional palpitations and feelings of chest discomfort that usually occur while lying down at night, but he occasionally feels them with exertion as well.  He states that they are not very bothersome and they have not progressed.  He denies any episodes of bilateral lower extremity swelling recently.  Otherwise he denies any further cardiac complaints such as PND, orthopnea, syncope, or claudication symptoms.  He reports occasional episodes of lightheadedness and dizziness that appear to be positional in nature and they usually occur when he changes positions while he was lying down bed.  He is able to complete his ADLs " without any issues or ischemic symptoms.  He states that he has been walking much more for exercise and he has continued to lose weight.  He was lost 20 lb over the last 8 months.  He states that he hopes to lose 20 more.  He was unable to tolerate CPAP as he felt as though he was smothering.  Encouraged him to reach back out to sleep clinic/the CPAP  to see if another mask is available. He reports compliance with all of his current medications. He denies any tobacco use. Smokes marijuana to help with chronic pain.                                                                                                                                                                   CARDIAC TESTING:  Nuclear Stress - Cardiology Interpreted 4.26.23    Normal myocardial perfusion scan. There is no evidence of myocardial ischemia or infarction.    The gated perfusion images showed an ejection fraction of 59% at rest. The gated perfusion images showed an ejection fraction of 61% post stress.    The patient reported no chest pain during the stress test.    There were no arrhythmias during stress.  On the nuclear stress test the EF is normal.  If the patient has an echo, the EF on the echo is considered more accurate.  The patient has a low risk nuclear stress test.    Echo 3.27.23  · Normal right ventricular size with normal right ventricular systolic function.  · Mild pulmonic regurgitation.  · Mild mitral regurgitation.  · The left ventricle is normal in size with normal systolic function.  · The estimated ejection fraction is 60%.  · Normal left ventricular diastolic function.  · There is no pulmonary hypertension.    Patient Active Problem List   Diagnosis    Fungal toenail infection    Palpitations    Hypertension    Hyperlipidemia LDL goal <70    Cervical myelopathy with cervical radiculopathy    HLD (hyperlipidemia)    SOB (shortness of breath)     Past Surgical History:   Procedure Laterality Date     CERVICAL FUSION      [facility] Dr[facility] 2021    CHOLECYSTECTOMY      FUSION OF CERVICAL SPINE BY POSTERIOR APPROACH  2017    [place]    SPINE SURGERY  March, 2021     Social History     Socioeconomic History    Marital status: Single   Occupational History    Occupation: dissabled   Tobacco Use    Smoking status: Never    Smokeless tobacco: Never   Substance and Sexual Activity    Alcohol use: Yes     Alcohol/week: 6.0 standard drinks of alcohol     Types: 4 Glasses of wine, 2 Shots of liquor per week     Comment: glass per night (sm)    Drug use: Not Currently     Types: Marijuana     Comment: 3 puffs twice a day    Sexual activity: Not Currently     Partners: Female     Birth control/protection: None     Social Determinants of Health     Financial Resource Strain: Medium Risk (3/27/2024)    Overall Financial Resource Strain (CARDIA)     Difficulty of Paying Living Expenses: Somewhat hard   Food Insecurity: Food Insecurity Present (3/27/2024)    Hunger Vital Sign     Worried About Running Out of Food in the Last Year: Sometimes true     Ran Out of Food in the Last Year: Sometimes true   Transportation Needs: No Transportation Needs (3/27/2024)    PRAPARE - Transportation     Lack of Transportation (Medical): No     Lack of Transportation (Non-Medical): No   Physical Activity: Insufficiently Active (3/27/2024)    Exercise Vital Sign     Days of Exercise per Week: 3 days     Minutes of Exercise per Session: 10 min   Stress: No Stress Concern Present (3/27/2024)    [employer] [place] [employer] - Occupational Stress Questionnaire     Feeling of Stress : Only a little   Housing Stability: Low Risk  (3/27/2024)    Housing Stability Vital Sign     Unable to Pay for Housing in the Last Year: No     Number of Places Lived in the Last Year: 1     Unstable Housing in the Last Year: No        Family History   Problem Relation Name Age of Onset    No Known Problems Mother      No Known Problems Father      Diabetes  Paternal Grandfather Shad Jackson      Review of patient's allergies indicates:   Allergen Reactions    Iodine Nausea And Vomiting     Seafood.    Shellfish containing products Shortness Of Breath         ROS:  Review of Systems   Constitutional: Negative.    HENT: Negative.     Eyes: Negative.    Respiratory:  Positive for shortness of breath (With over exertion). Negative for sputum production.    Cardiovascular:  Positive for palpitations (Occasional at night) and leg swelling (Occasional). Negative for chest pain, orthopnea, claudication and PND.   Gastrointestinal: Negative.  Negative for nausea and vomiting.   Genitourinary: Negative.    Musculoskeletal:  Positive for back pain, falls (Mechanical falls) and joint pain.   Skin: Negative.    Neurological: Negative.    Endo/Heme/Allergies: Negative.    Psychiatric/Behavioral: Negative.                                                                                                                                                                                  Negative except as stated in the history of present illness. See HPI for details.    PHYSICAL EXAM:  There were no vitals taken for this visit.      Physical Exam  Constitutional:       Appearance: Normal appearance. He is obese.   HENT:      Head: Normocephalic.      Nose: Nose normal.      Mouth/Throat:      Mouth: Mucous membranes are moist.   Eyes:      Extraocular Movements: Extraocular movements intact.   Cardiovascular:      Rate and Rhythm: Normal rate and regular rhythm.      Pulses: Normal pulses.      Heart sounds: Normal heart sounds. No murmur heard.  Pulmonary:      Effort: Pulmonary effort is normal. No respiratory distress.      Breath sounds: Normal breath sounds.   Abdominal:      Palpations: Abdomen is soft.   Musculoskeletal:      Cervical back: Normal range of motion.      Right lower leg: No edema.      Left lower leg: No edema.   Skin:     General: Skin is warm and dry.    Neurological:      Mental Status: He is alert and oriented to person, place, and time.         Current Outpatient Medications   Medication Instructions    albuterol (VENTOLIN HFA) 90 mcg/actuation inhaler 2 puffs, Inhalation, Every 6 hours PRN, Rescue    amLODIPine (NORVASC) 2.5 mg, Oral, Daily    aspirin (ECOTRIN) 81 mg, Oral, Daily    budesonide-formoterol 160-4.5 mcg (SYMBICORT) 160-4.5 mcg/actuation HFAA 2 puffs, Inhalation, Every 12 hours, Controller    cetirizine (ZYRTEC) 5 mg, Oral, Daily    ciclopirox (PENLAC) 8 % Soln Topical (Top), Daily    ezetimibe (ZETIA) 10 mg, Oral, Daily    ezetimibe (ZETIA) 10 mg, Oral, Daily    ketoconazole (NIZORAL) 2 % cream Topical (Top), 2 times daily    metoprolol tartrate (LOPRESSOR) 25 mg, Oral, 2 times daily    metoprolol tartrate (LOPRESSOR) 25 mg, Oral, 2 times daily    nitroGLYCERIN (NITROSTAT) 0.4 mg, Sublingual, Every 5 min PRN    NYSTOP powder Topical (Top), 2 times daily    pantoprazole (PROTONIX) 40 mg, Oral, Daily    paroxetine (PAXIL) 30 mg, Oral, Daily    predniSONE (DELTASONE) 10 mg, Oral, As needed (PRN)    pregabalin (LYRICA) 75 mg, Oral, 3 times daily    rosuvastatin (CRESTOR) 40 mg, Oral, Nightly    rosuvastatin (CRESTOR) 40 mg, Oral, Nightly    sumatriptan (IMITREX) 25 mg, Oral        All medications, laboratory studies, cardiac diagnostic imaging reviewed.     Lab Results   Component Value Date    LDL 82.00 03/28/2024    LDL 86.00 12/04/2023    TRIG 104 03/28/2024    TRIG 81 12/04/2023    CREATININE 1.18 12/04/2023    MG 1.97 01/13/2021    K 4.3 12/04/2023        ASSESSMENT/PLAN:    SOB/LEW  - Stable from last visit  - No progression of symptoms. Occur at rest and with exertion  - Echo and stress test as above  - No indication for further testing at this time     Edema  - No recent episodes of edema   - Reports stable, occasional peripheral edema when on feet for extended periods of time.   - Continue with conservative measures with compression stockings  and elevation of legs when possible.  - Echo with EF 60%, mild AL/MR, normal RV size and function, normal LV diastolic function (3.27.23)  - Nuclear Stress Test revealed normal myocardial perfusion scan, no evidence of ischemia or infarction, rest EF 59%, post stress EF 61%, no chest pain during test, no arrhythmias during stress test (4.26.23)  - Likely non-cardiac, may consider venous insufficiency US in future if symptoms persist, though no evidence of edema on exam today   - Recommend further evaluation by PCP     HTN   - BP at goal 121/84  - Continue Norvasc   - Counseled on low sodium diet and exercise as tolerated     HLD   - LDL 84 per labs March 2024  - Continue Crestor and Zetia  - Continue Repatha and is tolerating well   - Counseled on heart healthy, low cholesterol diet and exercise as tolerated      Left-Sided Spacticity/Weakness  - Stable. Also reports ongoing chronic neck and back pain   - Concern for possible stroke in 2015 b/c of left sided weakness but MRI Brain was never performed. Pt found to have PFO on CHRIS in 2015, so there was a question as to whether he would benefit from PFO closure. Case discussed with Neurology who clarified that patient's left-sided symptoms are due to spasticity from a cervical cord compression in 2015 and is not due to a stroke. So we will not refer him for PFO closure.   - Continue Aspirin, Statin  - Management per PCP/Neurology     DM   - Diet controlled. A1c 5.6% per labs December 2023  - Management per PCP      JOEL  - Confirmed on sleep study  - Patient states that he was unable to tolerate the CPAP mask.  He ultimately ended up returning the CPAP to [facility].  Encouraged him to reach back out to [facility] and/or sleep clinic to see if he is a candidate for an alternative mask  - Strongly encouraged compliance with CPAP and again educated on the detrimental effects of untreated sleep apnea    Obesity  - Counseled on the importance of weight loss through  healthy diet and exercise       Follow up in cardiology clinic in 5 months or sooner if needed   Complete FLP/CMP prior next office visit  Follow up with PCP as directed   Please notify clinic if any new concerns or any change in symptoms

## 2024-07-26 NOTE — PATIENT INSTRUCTIONS
Follow up in cardiology clinic in 5 months or sooner if needed   Complete FLP/CMP prior next office visit  Follow up with PCP as directed   Please notify clinic if any new concerns or any change in symptoms

## 2024-10-07 ENCOUNTER — OFFICE VISIT (OUTPATIENT)
Dept: INTERNAL MEDICINE | Facility: CLINIC | Age: 49
End: 2024-10-07
Payer: MEDICAID

## 2024-10-07 VITALS
WEIGHT: 273.38 LBS | RESPIRATION RATE: 99 BRPM | TEMPERATURE: 98 F | DIASTOLIC BLOOD PRESSURE: 76 MMHG | BODY MASS INDEX: 40.49 KG/M2 | SYSTOLIC BLOOD PRESSURE: 120 MMHG | HEIGHT: 69 IN | HEART RATE: 60 BPM

## 2024-10-07 DIAGNOSIS — I63.9 CEREBROVASCULAR ACCIDENT (CVA), UNSPECIFIED MECHANISM: ICD-10-CM

## 2024-10-07 DIAGNOSIS — I10 HYPERTENSION, UNSPECIFIED TYPE: ICD-10-CM

## 2024-10-07 DIAGNOSIS — J45.31 MILD PERSISTENT ASTHMA WITH ACUTE EXACERBATION: ICD-10-CM

## 2024-10-07 DIAGNOSIS — E78.5 HYPERLIPIDEMIA LDL GOAL <70: ICD-10-CM

## 2024-10-07 PROCEDURE — 99213 OFFICE O/P EST LOW 20 MIN: CPT | Mod: PBBFAC

## 2024-10-07 RX ORDER — METOPROLOL TARTRATE 25 MG/1
25 TABLET, FILM COATED ORAL 2 TIMES DAILY
Qty: 180 TABLET | Refills: 3 | Status: SHIPPED | OUTPATIENT
Start: 2024-10-07

## 2024-10-07 RX ORDER — PANTOPRAZOLE SODIUM 40 MG/1
40 TABLET, DELAYED RELEASE ORAL DAILY
Qty: 30 TABLET | Refills: 2 | Status: SHIPPED | OUTPATIENT
Start: 2024-10-07

## 2024-10-07 RX ORDER — PREGABALIN 75 MG/1
75 CAPSULE ORAL 3 TIMES DAILY
Qty: 90 CAPSULE | Refills: 6 | Status: SHIPPED | OUTPATIENT
Start: 2024-10-07 | End: 2025-05-05

## 2024-10-07 RX ORDER — ALBUTEROL SULFATE 90 UG/1
2 INHALANT RESPIRATORY (INHALATION) EVERY 6 HOURS PRN
Qty: 0.103 G | Refills: 3 | Status: SHIPPED | OUTPATIENT
Start: 2024-10-07 | End: 2025-10-07

## 2024-10-07 RX ORDER — PAROXETINE 30 MG/1
30 TABLET, FILM COATED ORAL DAILY
Qty: 30 TABLET | Refills: 2 | Status: SHIPPED | OUTPATIENT
Start: 2024-10-07

## 2024-10-07 RX ORDER — BUDESONIDE AND FORMOTEROL FUMARATE DIHYDRATE 160; 4.5 UG/1; UG/1
2 AEROSOL RESPIRATORY (INHALATION) EVERY 12 HOURS
Qty: 10.2 G | Refills: 1 | Status: SHIPPED | OUTPATIENT
Start: 2024-10-07 | End: 2025-10-07

## 2024-10-07 RX ORDER — AMLODIPINE BESYLATE 2.5 MG/1
2.5 TABLET ORAL DAILY
Qty: 90 TABLET | Refills: 3 | Status: SHIPPED | OUTPATIENT
Start: 2024-10-07

## 2024-10-07 RX ORDER — ASPIRIN 81 MG/1
81 TABLET ORAL DAILY
Qty: 30 TABLET | Refills: 4 | Status: SHIPPED | OUTPATIENT
Start: 2024-10-07

## 2024-10-07 RX ORDER — EZETIMIBE 10 MG/1
10 TABLET ORAL DAILY
Qty: 90 TABLET | Refills: 3 | Status: SHIPPED | OUTPATIENT
Start: 2024-10-07 | End: 2025-10-07

## 2024-10-07 NOTE — PROGRESS NOTES
I have reviewed the notes, assessments, and/or procedures performed this visit, and I concur with the documentation.

## 2024-10-07 NOTE — PROGRESS NOTES
INTER[facility]  CLINIC NOTE    Patient Name: John Jackson  YOB: 1975    PRESENTING HISTORY       History of Present Illness:  Mr. John Jackson is a 49 y.o. male  With PMHx: HTN, HLD, CVA (2015 w/ L sided weakness), PFO, chronic low back and neck pain, hx of gastric ulcer    This is a 46 y.o AAM who presents to clinic as a routine f/u. Denies any fever, chills, chest pain, palpitations, nausea, vomiting, abdominal pain, lower extremity swelling, weakness, dizziness, syncope Patient underwent C5-C7 anterior spine fusion by Dr. Aj on 3/17/21 and was scheduled to have L3-L4 fusion with Dr. Aj on 08/16/2021, but had to cancel due to his wife needing surgery.    Interval Hx: Reports difficulty swallowing solids, no difficulty swallowing liquids. He does endorse intermittent symptoms of pain with swallowing and states the he feels the sensation of food getting stuck in his throat but never vomits. He has also lost 6 ibs since Sept 2021 until now. He reports these symptoms all started a little before August 2021 but this is the first time patient has ever mentioned anything to me. Appetite has lessened some. He denies any abdominal pain, night sweats, fever/chills. He is taking omeprazole 20mg daily which has been on chronically. I ordered lab work for today's visit which he had done in [place] but does not have records sent here yet. Will try to obtain.     Was seen by Neurology, symptoms likely due to cervical myelopathy with carpopedal spasms and dystonic toes. Spasticity not due to stroke. PFO closure not necessary at this time, and was seen by Cardiology. LDL not at goal this time. Patient today complains of congestion, itchy throat, some mild SOB no wheezing. He states it has been going on for three days. Additionally still has dysphagia complaints, needs fluids to figueroa down his food otherwise solids comeback up. Not necessarily vomiting.    Flow Esophgram 1/2023  was negative.  Patient has been complaining of worsening headaches, as well as continued falls.  He states that his headaches begin either occipitally or in his right temporal area and spread associated with auras (visual) and lights. He states that it last for 1 to 3 days, totalling greater than 15 days in a month.     States he has shortness of breath that previously had been prevented with Symbicort. Headaches are still present however not a chief complaint. Lyrica doesn't help as much with pain as current dosage. Still has follow up for multiple specialties. See below in plan.    PFT done showing moderate obstruction with good response to bronchodilators. Otherwise has continued headaches. Neck Pain. HE has lost weight on his diet. BP is good. Advised to continue CPAP    10/7/2024: Patient minimal complaints. States he has had headaches. Advised on ibuprofen and tylenol as well as lyrica. Seen by NSGY and plan for PT with eventual possible intervention. Otherwise he complains of headaches >15 days in a month, lasting 3-5 min and can last 2 days.      CURRENT MEDICATIONS      Current Outpatient Medications on File Prior to Visit   Medication Sig    cetirizine (ZYRTEC) 5 MG tablet Take 1 tablet (5 mg total) by mouth once daily.    evolocumab (REPATHA SURECLICK) 140 mg/mL PnIj Inject 1 mL (140 mg total) into the skin every 14 (fourteen) days.    nitroGLYCERIN (NITROSTAT) 0.4 MG SL tablet Place 1 tablet (0.4 mg total) under the tongue every 5 (five) minutes as needed for Chest pain.    NYSTOP powder Apply topically 2 (two) times daily.    ondansetron (ZOFRAN-ODT) 4 MG TbDL Take 1 tablet (4 mg total) by mouth every 24 hours as needed (For nausea, when taking Repatha).    predniSONE (DELTASONE) 10 MG tablet Take 1 tablet (10 mg total) by mouth as needed (For SOB with Asthma).    rosuvastatin (CRESTOR) 40 MG Tab Take 1 tablet (40 mg total) by mouth every evening.    [DISCONTINUED] albuterol (VENTOLIN HFA) 90  mcg/actuation inhaler Inhale 2 puffs into the lungs every 6 (six) hours as needed for Wheezing. Rescue    [DISCONTINUED] amLODIPine (NORVASC) 2.5 MG tablet Take 1 tablet (2.5 mg total) by mouth once daily.    [DISCONTINUED] aspirin (ECOTRIN) 81 MG EC tablet Take 1 tablet (81 mg total) by mouth once daily.    [DISCONTINUED] budesonide-formoterol 160-4.5 mcg (SYMBICORT) 160-4.5 mcg/actuation HFAA Inhale 2 puffs into the lungs every 12 (twelve) hours. Controller    [DISCONTINUED] ezetimibe (ZETIA) 10 mg tablet Take 1 tablet (10 mg total) by mouth once daily.    [DISCONTINUED] metoprolol tartrate (LOPRESSOR) 25 MG tablet Take 1 tablet (25 mg total) by mouth 2 (two) times daily.    [DISCONTINUED] pantoprazole (PROTONIX) 40 MG tablet Take 1 tablet (40 mg total) by mouth once daily.    [DISCONTINUED] paroxetine (PAXIL) 30 MG tablet Take 1 tablet (30 mg total) by mouth once daily.    [DISCONTINUED] pregabalin (LYRICA) 75 MG capsule Take 1 capsule (75 mg total) by mouth 3 (three) times daily.    [DISCONTINUED] sumatriptan (IMITREX) 25 MG Tab Take 25 mg by mouth daily as needed.    [DISCONTINUED] ciclopirox (PENLAC) 8 % Soln Apply topically Daily. (Patient not taking: Reported on 10/7/2024)    [DISCONTINUED] ezetimibe (ZETIA) 10 mg tablet Take 1 tablet (10 mg total) by mouth once daily.    [DISCONTINUED] ketoconazole (NIZORAL) 2 % cream Apply topically 2 (two) times daily. (Patient not taking: Reported on 10/7/2024)    [DISCONTINUED] metoprolol tartrate (LOPRESSOR) 25 MG tablet Take 1 tablet (25 mg total) by mouth 2 (two) times daily.    [DISCONTINUED] rosuvastatin (CRESTOR) 40 MG Tab Take 1 tablet (40 mg total) by mouth every evening.     No current facility-administered medications on file prior to visit.         Review of Systems   HENT:  Negative for hearing loss.    Eyes:  Negative for discharge.   Respiratory:  Negative for wheezing.    Cardiovascular:  Negative for chest pain and palpitations.   Gastrointestinal:   Positive for constipation. Negative for blood in stool, diarrhea and vomiting.   Genitourinary:  Positive for urgency. Negative for hematuria.   Musculoskeletal:  Positive for neck pain.   Neurological:  Positive for weakness and headaches.   Endo/Heme/Allergies:  Positive for polydipsia.       PAST HISTORY:     Past Medical History:   Diagnosis Date    Diabetes mellitus, type 2     Hyperlipidemia     Hypertension        Past Surgical History:   Procedure Laterality Date    CERVICAL FUSION      [facility] [place] 2021    CHOLECYSTECTOMY      FUSION OF CERVICAL SPINE BY POSTERIOR APPROACH  2017    [place]    SPINE SURGERY  March, 2021       Family History   Problem Relation Name Age of Onset    No Known Problems Mother      No Known Problems Father      Diabetes Paternal Grandfather Shad Jackson        Social History     Socioeconomic History    Marital status: Single   Occupational History    Occupation: dissabled   Tobacco Use    Smoking status: Never    Smokeless tobacco: Never   Substance and Sexual Activity    Alcohol use: Yes     Alcohol/week: 6.0 standard drinks of alcohol     Types: 4 Glasses of wine, 2 Shots of liquor per week     Comment: glass per night (sm)    Drug use: Yes     Types: Marijuana     Comment: 3 puffs twice a day    Sexual activity: Not Currently     Partners: Female     Birth control/protection: None     Social Drivers of Health     Financial Resource Strain: Medium Risk (3/27/2024)    Overall Financial Resource Strain (CARDIA)     Difficulty of Paying Living Expenses: Somewhat hard   Food Insecurity: Food Insecurity Present (3/27/2024)    Hunger Vital Sign     Worried About Running Out of Food in the Last Year: Sometimes true     Ran Out of Food in the Last Year: Sometimes true   Transportation Needs: No Transportation Needs (3/27/2024)    PRAPARE - Transportation     Lack of Transportation (Medical): No     Lack of Transportation (Non-Medical): No   Physical Activity: Insufficiently  "Active (3/27/2024)    Exercise Vital Sign     Days of Exercise per Week: 3 days     Minutes of Exercise per Session: 10 min   Stress: No Stress Concern Present (3/27/2024)    [employer] - Occupational Stress Questionnaire     Feeling of Stress : Only a little   Housing Stability: Low Risk  (3/27/2024)    Housing Stability Vital Sign     Unable to Pay for Housing in the Last Year: No     Number of Places Lived in the Last Year: 1     Unstable Housing in the Last Year: No       Review of patient's allergies indicates:   Allergen Reactions    Iodine Nausea And Vomiting     Seafood.    Shellfish containing products Shortness Of Breath       OBJECTIVE:   Vital Signs:  Vitals:    10/07/24 0758   BP: 120/76   Pulse: 60   Resp: (!) 99   Temp: 97.9 °F (36.6 °C)   TempSrc: Oral   Weight: 124 kg (273 lb 6.4 oz)   Height: 5' 9" (1.753 m)         No results found for this or any previous visit (from the past 24 hours).        Physical Exam  Constitutional:       Appearance: He is obese.   HENT:      Head: Normocephalic.      Nose: Congestion and rhinorrhea present.   Eyes:      General: Visual field deficit present.      Extraocular Movements: Extraocular movements intact.      Pupils: Pupils are equal, round, and reactive to light.   Cardiovascular:      Rate and Rhythm: Normal rate and regular rhythm.      Pulses: Normal pulses.      Heart sounds: Normal heart sounds.   Pulmonary:      Effort: Pulmonary effort is normal.      Breath sounds: Normal breath sounds.   Musculoskeletal:      Cervical back: Rigidity and tenderness present.   Neurological:      Mental Status: He is alert.      Cranial Nerves: Cranial nerve deficit present.      Motor: Weakness and abnormal muscle tone present. No tremor.      Coordination: Romberg sign positive. Finger-Nose-Finger Test abnormal.      Comments: Left Hand strength 3+/5 vs 5/5 RH    Movement Exam:    Hypophonic, Hypomimia  No postural or rest tremors  EOM " slow, not   FTN without tremor  Hypokinesia in Bilateral Upper extremities on finger taps, decrement more apparent on right, left hypokinetic  Increased tone on left, spastic     Psychiatric:         Mood and Affect: Mood normal.         Behavior: Behavior normal.         Laboratory  CMP:   Recent Labs   Lab 08/18/22  1607 04/03/23  0904 12/04/23  0700   Sodium 140 142 143   Potassium 4.3 4.7 4.3   CO2 29 28 29   Blood Urea Nitrogen 12.9 13.2 13.7   Creatinine 1.19 H 1.27 H 1.18   Glucose 87 109 H 112 H   Calcium 9.6 10.0 9.4   Albumin 4.1 4.0 3.7   Bilirubin Total 0.5 0.4 0.4   AST 25 24 25   ALT 30 29 29   ALP 73 64 67     CBC:   Recent Labs   Lab 04/03/23  0904 12/04/23  0700   WBC 8.5 9.25   Neut # 4.96 5.28   RBC 5.50 5.25   Hgb 14.3 14.0   Hct 45.2 43.5   Platelet 365 377   MCV 82.2 82.9   RDW 13.9 14.0     FLP:   Recent Labs   Lab 04/03/23  0904 12/04/23  0700 03/28/24  0906   Cholesterol Total 224 H 142 147   HDL Cholesterol 43 40 44   LDL Cholesterol 153.00 H 86.00 82.00   Triglyceride 138 81 104     DM:   Recent Labs   Lab 08/18/22  1607 04/03/23  0904 12/04/23  0700   Hemoglobin A1c 5.6 5.8 5.6   Creatinine 1.19 H 1.27 H 1.18     Thyroid:   Recent Labs   Lab 12/04/23  0700   TSH 1.346   Thyroxine Free 0.73     Anemia:   Recent Labs   Lab 08/18/22  1607 04/03/23  0904 12/04/23  0700   Hgb  --    < > 14.0   Hct  --    < > 43.5   Iron Level 75  --   --    Ferritin Level 242.61  --  146.42   Iron Binding Capacity Total 304  --   --     < > = values in this interval not displayed.       ASSESSMENT & PLAN:         Health Maintenance Due   Topic Date Due    Influenza Vaccine (1) 09/01/2024    COVID-19 Vaccine (5 - 2024-25 season) 09/01/2024        Cervical spine stenosis s/p C5-7 Cervical Fusion Anterior on 3/17/21  Migraines  - MRI lumbar spine in April 2015 showed diffuse spondylitic changes with mild diffuse narrowing of neural foramina, multilevel disc bulging  - Status post neck surgery in November 2015 at  [facility]  - Most recently underwent C5-C7 spine fusion by Dr. Aj, scheduled to undergo L3-L4 fusion with Dr. Aj on 08/16/2021 which was cancelled and being rescheduled for next year, advised to schedule follow up  - Continue with increased dose of Lyrica  - Follow up with PT and NSGY, may eventually need intervention    Hx of PFO/ chronic chest pain on exertion  - PFO with right to left shunt seen on [facility] in Sept 2015  - DSE was performed on 2/2017 which was negative for ischemia  - 48-Hour Holter Monitor (1/13/21): No significant arrhythmias found  - TTE (1/13/21): EF 55%, repeat pending  - DSE (1/13/21): Negative for ischemia  - Followed by [facility] cardiology last seen on 10/2023 with no plans for PFO closure at this time    Dysphagia -> Describes regurgitation of solid food if not follow by liquids  - Neurology note 11/2022 - cervical myelopathy with radiculopathy with residual dystonia and carpopedal spasm  - Barium Swallow 1/2023 with only mild reflux   - continue protonix 40mg daily    History of CVA with residual left-sided weakness, as reported  - Stable, no new deficits; uses walker and/or cane at home for ambulation.  - Would like to get MRI to quantify extent of lesions, will hold off for now  - Continue aspirin 81mg daily  - LDL increased to now 160, currently on Zettia and Rosuvastatin max dose    Pre-diabetes  - A1C is 5.6% today  - Will continue metformin for now    Essential HTN, well-controlled  - BP today at goal  - Continue current medications Norvasc 2.5mg daily and metoprolol 25mg BID  - Low-sodium diet and exercise discussed    Depression, stable  - will continue home med paroxetine 30 mg daily    Asthma, Mild Persistent  -report hx of childhood asthma  -Prescribed symbicort 160-4.5m patient is compliant  -States he has 4x per month needs albuterol rescue inhaler, sometimes wakes him up at night  -PFTs done showed moderate obstruction with appr  -Keep prednisone 10 mg PRN 10 day  supply    Toenail Fungus  - Referral to Podiatry made  - Will advise on topicals for now    JOEL  - Sleep study done, advised on continuing CPAP    Health Maintenance:  TDAP: 10/15/2020  CAGE screenin/4  HIV screening: Nonreactive  Hepatitis and Syphilis Screen: Negative  Influenza vaccine: UTD  Tdap: given 10/15/2020  colon cancer screening: Done repeat 2025  AAA - never smoker  COVID - UTD  PCV 23 - Given 2022    RTC 6 months with labs, advised on Neurology and NSGY follow up, adherence to CPAP      Future Appointments     Future Appointments   Date Time Provider Department Center   2024 12:45 PM Annel Pickett, JOCELINE [facility] CARD [facility]      Orders Placed This Encounter   Procedures    Hemoglobin A1C     Standing Status:   Future     Standing Expiration Date:   2025    CBC Auto Differential     Sometime before Next appointment     Standing Status:   Future     Standing Expiration Date:   2025    Comprehensive Metabolic Panel     Sometime before Next appointment     Standing Status:   Future     Standing Expiration Date:   2025    Iron and TIBC     Sometime before Next appointment     Standing Status:   Future     Standing Expiration Date:   2025    Lipid Panel     Sometime before Next appointment     Standing Status:   Future     Standing Expiration Date:   2025    Microalbumin/Creatinine Ratio, Urine     Sometime before next appointment     Standing Status:   Future     Standing Expiration Date:   10/7/2025     Order Specific Question:   Specimen Source     Answer:   Urine    TSH     Sometime before Next appointment     Standing Status:   Future     Standing Expiration Date:   2025         Discussed with Dr. Morales - Staff Attestation to Follow    Tu Ramirez MD  [facility] Internal [facility] PGY-3

## 2025-02-26 ENCOUNTER — OFFICE VISIT (OUTPATIENT)
Dept: CARDIOLOGY | Facility: CLINIC | Age: 50
End: 2025-02-26
Payer: COMMERCIAL

## 2025-02-26 ENCOUNTER — LAB VISIT (OUTPATIENT)
Dept: LAB | Facility: HOSPITAL | Age: 50
End: 2025-02-26
Payer: COMMERCIAL

## 2025-02-26 VITALS
OXYGEN SATURATION: 98 % | SYSTOLIC BLOOD PRESSURE: 122 MMHG | BODY MASS INDEX: 40.23 KG/M2 | HEART RATE: 66 BPM | DIASTOLIC BLOOD PRESSURE: 78 MMHG | HEIGHT: 69 IN | RESPIRATION RATE: 18 BRPM | WEIGHT: 271.63 LBS | TEMPERATURE: 98 F

## 2025-02-26 DIAGNOSIS — R00.2 PALPITATIONS: Primary | ICD-10-CM

## 2025-02-26 DIAGNOSIS — E78.2 MIXED HYPERLIPIDEMIA: Primary | ICD-10-CM

## 2025-02-26 DIAGNOSIS — E78.2 MIXED HYPERLIPIDEMIA: ICD-10-CM

## 2025-02-26 DIAGNOSIS — E78.5 HYPERLIPIDEMIA LDL GOAL <70: ICD-10-CM

## 2025-02-26 DIAGNOSIS — I10 PRIMARY HYPERTENSION: ICD-10-CM

## 2025-02-26 DIAGNOSIS — R06.02 SOB (SHORTNESS OF BREATH): ICD-10-CM

## 2025-02-26 LAB
ALBUMIN SERPL-MCNC: 3.7 G/DL (ref 3.5–5)
ALBUMIN/GLOB SERPL: 0.9 RATIO (ref 1.1–2)
ALP SERPL-CCNC: 64 UNIT/L (ref 40–150)
ALT SERPL-CCNC: 29 UNIT/L (ref 0–55)
ANION GAP SERPL CALC-SCNC: 6 MEQ/L
AST SERPL-CCNC: 23 UNIT/L (ref 5–34)
BILIRUB SERPL-MCNC: 0.4 MG/DL
BUN SERPL-MCNC: 11.6 MG/DL (ref 8.9–20.6)
CALCIUM SERPL-MCNC: 8.9 MG/DL (ref 8.4–10.2)
CHLORIDE SERPL-SCNC: 106 MMOL/L (ref 98–107)
CHOLEST SERPL-MCNC: 222 MG/DL
CHOLEST/HDLC SERPL: 4 {RATIO} (ref 0–5)
CO2 SERPL-SCNC: 29 MMOL/L (ref 22–29)
CREAT SERPL-MCNC: 0.96 MG/DL (ref 0.72–1.25)
CREAT/UREA NIT SERPL: 12
GFR SERPLBLD CREATININE-BSD FMLA CKD-EPI: >60 ML/MIN/1.73/M2
GLOBULIN SER-MCNC: 4.2 GM/DL (ref 2.4–3.5)
GLUCOSE SERPL-MCNC: 106 MG/DL (ref 74–100)
HDLC SERPL-MCNC: 55 MG/DL (ref 35–60)
LDLC SERPL CALC-MCNC: 147 MG/DL (ref 50–140)
POTASSIUM SERPL-SCNC: 4.5 MMOL/L (ref 3.5–5.1)
PROT SERPL-MCNC: 7.9 GM/DL (ref 6.4–8.3)
SODIUM SERPL-SCNC: 141 MMOL/L (ref 136–145)
TRIGL SERPL-MCNC: 98 MG/DL (ref 34–140)
VLDLC SERPL CALC-MCNC: 20 MG/DL

## 2025-02-26 PROCEDURE — 99214 OFFICE O/P EST MOD 30 MIN: CPT | Mod: S$PBB,,,

## 2025-02-26 PROCEDURE — 80053 COMPREHEN METABOLIC PANEL: CPT

## 2025-02-26 PROCEDURE — 3008F BODY MASS INDEX DOCD: CPT | Mod: CPTII,,,

## 2025-02-26 PROCEDURE — 3078F DIAST BP <80 MM HG: CPT | Mod: CPTII,,,

## 2025-02-26 PROCEDURE — 93005 ELECTROCARDIOGRAM TRACING: CPT

## 2025-02-26 PROCEDURE — 99215 OFFICE O/P EST HI 40 MIN: CPT | Mod: PBBFAC,25

## 2025-02-26 PROCEDURE — 36415 COLL VENOUS BLD VENIPUNCTURE: CPT

## 2025-02-26 PROCEDURE — 1159F MED LIST DOCD IN RCRD: CPT | Mod: CPTII,,,

## 2025-02-26 PROCEDURE — 3074F SYST BP LT 130 MM HG: CPT | Mod: CPTII,,,

## 2025-02-26 PROCEDURE — 80061 LIPID PANEL: CPT

## 2025-02-26 PROCEDURE — 1160F RVW MEDS BY RX/DR IN RCRD: CPT | Mod: CPTII,,,

## 2025-02-26 RX ORDER — ROSUVASTATIN CALCIUM 40 MG/1
40 TABLET, COATED ORAL NIGHTLY
Qty: 90 TABLET | Refills: 3 | Status: SHIPPED | OUTPATIENT
Start: 2025-02-26

## 2025-02-26 NOTE — PATIENT INSTRUCTIONS
EKG  Follow up in Cardiology Clinic in 5 months or sooner if needed  Follow up with PCP as directed

## 2025-02-26 NOTE — PROGRESS NOTES
CHIEF COMPLAINT:   Chief Complaint   Patient presents with    Follow-up     Denies cardiac targets                                                  HPI:  John Jackson 49 y.o. male with a PMH significant for HTN, HLD, diet controlled DM, ?CVA x 2 (no brain MRI at the time), PFO, asthma who presents to cardiology clinic for follow up and results of testing. He reported having a coronary angiogram with Dr. Nate Mae in the past in which he was told he did not have any significant blockage. Patient completed a 48-hour Holter monitor in January 2021 in which no significant arrhythmias were found. Had a 30-day event monitor in October 2022 that did not show any arrhythmias. He completed an echocardiogram in January 2021 which revealed an ejection fraction of 55%. Dobutamine stress echocardiogram completed in January 2021 was negative for ischemia. Echo and Nuclear Stress completed April 2023. Echo revealed EF 60%, mild MA, mild MR. Nuclear stress test revealed a normal myocardial perfusion scan, no evidence of ischemia or infarction, EF 59% at rest and EF 61% post stress.  At last office visit patient stated that he felt well overall.  He continued to endorse some occasional palpitations and chest discomfort, but otherwise he felt stable.  He states that he had to toenails removed on Monday due to a fungal infection and he is still in extreme pain.  He did not take blood pressure medications prior to today's appointment.    Today the patient states that he feels stable from cardiac standpoint.  He continues to have occasional episodes of lightheadedness and dizziness, but states that these occur at random.  He is unable to describe them.  He has rare palpitations when lying down, but states that they are stable.  He still has not obtained a new CPAP and is not interested in restarting.  He endorses ongoing daytime fatigue, poor sleep, and inability to lose weight.  He has been inconsistently taking his  medications and all lipid panel indices are elevated today.  He still restart medications.  Otherwise he denies any chest pain, PND, orthopnea, near-syncope, syncope, lower extremity edema, or claudication symptoms.  He states that he is still able to complete his ADLs without any issues or ischemic symptoms.  He states that he is trying to lose weight and he has been exercising more frequently.  He states that he walks, bikes, and does light weights for exercise.  He reports compliance with his current medications, other than his cholesterol medications, and he states that he is tolerating them well.  Strongly encouraged him to reconsider CPAP management for sleep apnea.  He denies any tobacco use. Smokes marijuana to help with chronic pain.                                                                                                                                                                   CARDIAC TESTING:  Nuclear Stress - Cardiology Interpreted 4.26.23    Normal myocardial perfusion scan. There is no evidence of myocardial ischemia or infarction.    The gated perfusion images showed an ejection fraction of 59% at rest. The gated perfusion images showed an ejection fraction of 61% post stress.    The patient reported no chest pain during the stress test.    There were no arrhythmias during stress.  On the nuclear stress test the EF is normal.  If the patient has an echo, the EF on the echo is considered more accurate.  The patient has a low risk nuclear stress test.    Echo 3.27.23  · Normal right ventricular size with normal right ventricular systolic function.  · Mild pulmonic regurgitation.  · Mild mitral regurgitation.  · The left ventricle is normal in size with normal systolic function.  · The estimated ejection fraction is 60%.  · Normal left ventricular diastolic function.  · There is no pulmonary hypertension.    Patient Active Problem List   Diagnosis    Fungal toenail infection     Palpitations    Hypertension    Hyperlipidemia LDL goal <70    Cervical myelopathy with cervical radiculopathy    HLD (hyperlipidemia)    SOB (shortness of breath)     Past Surgical History:   Procedure Laterality Date    CERVICAL FUSION      [facility] 2021    CHOLECYSTECTOMY      FUSION OF CERVICAL SPINE BY POSTERIOR APPROACH  2017    [facility]    SPINE SURGERY  March, 2021     Social History     Socioeconomic History    Marital status: Single   Occupational History    Occupation: dissabled   Tobacco Use    Smoking status: Never    Smokeless tobacco: Never   Substance and Sexual Activity    Alcohol use: Yes     Alcohol/week: 2.0 standard drinks of alcohol     Types: 2 Shots of liquor per week     Comment: glass per night (sm)    Drug use: Not Currently     Types: Marijuana     Comment: 3 puffs twice a day    Sexual activity: Not Currently     Partners: Female     Birth control/protection: None     Social Drivers of Health     Financial Resource Strain: Medium Risk (3/27/2024)    Overall Financial Resource Strain (CARDIA)     Difficulty of Paying Living Expenses: Somewhat hard   Food Insecurity: Food Insecurity Present (3/27/2024)    Hunger Vital Sign     Worried About Running Out of Food in the Last Year: Sometimes true     Ran Out of Food in the Last Year: Sometimes true   Transportation Needs: No Transportation Needs (3/27/2024)    PRAPARE - Transportation     Lack of Transportation (Medical): No     Lack of Transportation (Non-Medical): No   Physical Activity: Insufficiently Active (3/27/2024)    Exercise Vital Sign     Days of Exercise per Week: 3 days     Minutes of Exercise per Session: 10 min   Stress: No Stress Concern Present (3/27/2024)    [employer] - Occupational Stress Questionnaire     Feeling of Stress : Only a little   Housing Stability: Low Risk  (3/27/2024)    Housing Stability Vital Sign     Unable to Pay for Housing in the Last Year: No     Number of Places  "Lived in the Last Year: 1     Unstable Housing in the Last Year: No        Family History   Problem Relation Name Age of Onset    No Known Problems Mother      No Known Problems Father      Diabetes Paternal Grandfather Shad Jackson      Review of patient's allergies indicates:   Allergen Reactions    Iodine Nausea And Vomiting     Seafood.    Shellfish containing products Shortness Of Breath         ROS:  Review of Systems   Constitutional: Negative.    HENT: Negative.     Eyes: Negative.    Respiratory:  Positive for shortness of breath (With over exertion). Negative for sputum production.    Cardiovascular:  Positive for palpitations (Occasional at night) and leg swelling (Occasional). Negative for chest pain, orthopnea, claudication and PND.   Gastrointestinal: Negative.  Negative for nausea and vomiting.   Genitourinary: Negative.    Musculoskeletal:  Positive for back pain, falls (Mechanical falls) and joint pain.   Skin: Negative.    Neurological: Negative.    Endo/Heme/Allergies: Negative.    Psychiatric/Behavioral: Negative.                                                                                                                                                                                  Negative except as stated in the history of present illness. See HPI for details.    PHYSICAL EXAM:  Visit Vitals  BP (!) 130/110 (BP Location: Right arm, Patient Position: Sitting)   Pulse 66   Temp 98 °F (36.7 °C) (Oral)   Resp 18   Ht 5' 9" (1.753 m)   Wt 123.2 kg (271 lb 9.6 oz)   SpO2 98%   BMI 40.11 kg/m²         Physical Exam  Constitutional:       Appearance: Normal appearance. He is obese.   HENT:      Head: Normocephalic.      Nose: Nose normal.      Mouth/Throat:      Mouth: Mucous membranes are moist.   Eyes:      Extraocular Movements: Extraocular movements intact.   Cardiovascular:      Rate and Rhythm: Normal rate and regular rhythm.      Pulses: Normal pulses.      Heart sounds: Normal heart " sounds. No murmur heard.  Pulmonary:      Effort: Pulmonary effort is normal. No respiratory distress.      Breath sounds: Normal breath sounds.   Abdominal:      Palpations: Abdomen is soft.   Musculoskeletal:      Cervical back: Normal range of motion.      Right lower leg: No edema.      Left lower leg: No edema.   Skin:     General: Skin is warm and dry.   Neurological:      Mental Status: He is alert and oriented to person, place, and time.         Current Outpatient Medications   Medication Instructions    albuterol (VENTOLIN HFA) 90 mcg/actuation inhaler 2 puffs, Inhalation, Every 6 hours PRN, Rescue    amLODIPine (NORVASC) 2.5 mg, Oral, Daily    aspirin (ECOTRIN) 81 mg, Oral, Daily    budesonide-formoterol 160-4.5 mcg (SYMBICORT) 160-4.5 mcg/actuation HFAA 2 puffs, Inhalation, Every 12 hours, Controller    cetirizine (ZYRTEC) 5 mg, Oral, Daily    ezetimibe (ZETIA) 10 mg, Oral, Daily    metoprolol tartrate (LOPRESSOR) 25 mg, Oral, 2 times daily    nitroGLYCERIN (NITROSTAT) 0.4 mg, Sublingual, Every 5 min PRN    NYSTOP powder 2 times daily    ondansetron (ZOFRAN-ODT) 4 mg, Oral, Every 24 hours as needed    pantoprazole (PROTONIX) 40 mg, Oral, Daily    paroxetine (PAXIL) 30 mg, Oral, Daily    predniSONE (DELTASONE) 10 mg, Oral, As needed (PRN)    pregabalin (LYRICA) 75 mg, Oral, 3 times daily    REPATHA SURECLICK 140 mg, Subcutaneous, Every 14 days    rosuvastatin (CRESTOR) 40 mg, Oral, Nightly        All medications, laboratory studies, cardiac diagnostic imaging reviewed.     Lab Results   Component Value Date    .00 (H) 02/26/2025    LDL 82.00 03/28/2024    TRIG 98 02/26/2025    TRIG 104 03/28/2024    CREATININE 0.96 02/26/2025    MG 1.97 01/13/2021    K 4.5 02/26/2025        ASSESSMENT/PLAN:    SOB/LWE  - Stable from last visit- he states that recently he has not had many issues   - No progression of symptoms. Occur at rest and with exertion  - Echo and stress test as above  - No indication for  further testing at this time  - EKG today revealed normal sinus rhythm, nonspecific ST abnormality, vent rate 67 beats per minute     Edema  - No recent episodes of edema -see HPI- no complaints   - Reports stable, occasional peripheral edema when on feet for extended periods of time.   - Continue with conservative measures with compression stockings and elevation of legs when possible.  - Echo with EF 60%, mild MO/MR, normal RV size and function, normal LV diastolic function (3.27.23)  - Nuclear Stress Test revealed normal myocardial perfusion scan, no evidence of ischemia or infarction, rest EF 59%, post stress EF 61%, no chest pain during test, no arrhythmias during stress test (4.26.23)  - Likely non-cardiac, may consider venous insufficiency US in future if symptoms persist, though no evidence of edema on exam today   - Recommend further evaluation by PCP     HTN   - BP at goal 122/78  - Continue Norvasc   - Counseled on low sodium diet and exercise as tolerated     HLD   -  per labs February 2025   - Continue Crestor and Zetia  - Continue Repatha and is tolerating well   - he admits that he has been noncompliant with his cholesterol medications.  He states that he will restart today.  We will repeat FLP/CMP prior next office visit  - Counseled on heart healthy, low cholesterol diet and exercise as tolerated      Left-Sided Spacticity/Weakness  - Stable. Also reports ongoing chronic neck and back pain - see HPI - no change in symptoms   - Concern for possible stroke in 2015 b/c of left sided weakness but MRI Brain was never performed. Pt found to have PFO on CHRIS in 2015, so there was a question as to whether he would benefit from PFO closure. Case discussed with Neurology who clarified that patient's left-sided symptoms are due to spasticity from a cervical cord compression in 2015 and is not due to a stroke. So we will not refer him for PFO closure.   - Continue Aspirin, Statin  - Management per  PCP/Neurology     DM   - Diet controlled. A1c 5.6% per labs December 2023  - Management per PCP      JOEL  - Confirmed on sleep study  - Patient states that he was unable to tolerate the CPAP mask.  He ultimately ended up returning the CPAP to [facility].  Encouraged him to reach back out to [facility] and/or sleep clinic to see if he is a candidate for an alternative mask- he is not interested at this time   - Strongly encouraged compliance with CPAP and again educated on the detrimental effects of untreated sleep apnea    Obesity  - Counseled on the importance of weight loss through healthy diet and exercise       EKG  Follow up in Cardiology Clinic in 5 months or sooner if needed  Follow up with PCP as directed

## 2025-02-27 LAB
OHS QRS DURATION: 86 MS
OHS QTC CALCULATION: 437 MS

## 2025-04-07 ENCOUNTER — TELEPHONE (OUTPATIENT)
Dept: CARDIOLOGY | Facility: CLINIC | Age: 50
End: 2025-04-07
Payer: COMMERCIAL

## 2025-04-07 NOTE — TELEPHONE ENCOUNTER
Pa SUBMITTED FOR REPATHA.    Clofazimine Counseling:  I discussed with the patient the risks of clofazimine including but not limited to skin and eye pigmentation, liver damage, nausea/vomiting, gastrointestinal bleeding and allergy.

## 2025-04-23 ENCOUNTER — LAB VISIT (OUTPATIENT)
Dept: LAB | Facility: HOSPITAL | Age: 50
End: 2025-04-23
Payer: COMMERCIAL

## 2025-04-23 ENCOUNTER — TELEPHONE (OUTPATIENT)
Dept: INTERNAL MEDICINE | Facility: CLINIC | Age: 50
End: 2025-04-23

## 2025-04-23 ENCOUNTER — OFFICE VISIT (OUTPATIENT)
Dept: INTERNAL MEDICINE | Facility: CLINIC | Age: 50
End: 2025-04-23
Payer: COMMERCIAL

## 2025-04-23 VITALS
BODY MASS INDEX: 39.61 KG/M2 | DIASTOLIC BLOOD PRESSURE: 85 MMHG | WEIGHT: 267.44 LBS | OXYGEN SATURATION: 99 % | SYSTOLIC BLOOD PRESSURE: 123 MMHG | RESPIRATION RATE: 20 BRPM | TEMPERATURE: 98 F | HEART RATE: 57 BPM | HEIGHT: 69 IN

## 2025-04-23 DIAGNOSIS — I63.9 CEREBROVASCULAR ACCIDENT (CVA), UNSPECIFIED MECHANISM: ICD-10-CM

## 2025-04-23 DIAGNOSIS — J45.31 MILD PERSISTENT ASTHMA WITH ACUTE EXACERBATION: ICD-10-CM

## 2025-04-23 DIAGNOSIS — I10 HYPERTENSION, UNSPECIFIED TYPE: ICD-10-CM

## 2025-04-23 DIAGNOSIS — E78.5 HYPERLIPIDEMIA LDL GOAL <70: ICD-10-CM

## 2025-04-23 DIAGNOSIS — F32.A DEPRESSION, UNSPECIFIED DEPRESSION TYPE: Primary | ICD-10-CM

## 2025-04-23 LAB
ALBUMIN SERPL-MCNC: 3.8 G/DL (ref 3.5–5)
ALBUMIN/GLOB SERPL: 1 RATIO (ref 1.1–2)
ALP SERPL-CCNC: 60 UNIT/L (ref 40–150)
ALT SERPL-CCNC: 40 UNIT/L (ref 0–55)
ANION GAP SERPL CALC-SCNC: 7 MEQ/L
AST SERPL-CCNC: 27 UNIT/L (ref 11–45)
BASOPHILS # BLD AUTO: 0.06 X10(3)/MCL
BASOPHILS NFR BLD AUTO: 0.7 %
BILIRUB SERPL-MCNC: 0.4 MG/DL
BUN SERPL-MCNC: 8.9 MG/DL (ref 8.9–20.6)
CALCIUM SERPL-MCNC: 9.1 MG/DL (ref 8.4–10.2)
CHLORIDE SERPL-SCNC: 108 MMOL/L (ref 98–107)
CHOLEST SERPL-MCNC: 101 MG/DL
CHOLEST/HDLC SERPL: 3 {RATIO} (ref 0–5)
CO2 SERPL-SCNC: 26 MMOL/L (ref 22–29)
CREAT SERPL-MCNC: 1.02 MG/DL (ref 0.72–1.25)
CREAT UR-MCNC: 309.6 MG/DL (ref 63–166)
CREAT/UREA NIT SERPL: 9
EOSINOPHIL # BLD AUTO: 0.29 X10(3)/MCL (ref 0–0.9)
EOSINOPHIL NFR BLD AUTO: 3.3 %
ERYTHROCYTE [DISTWIDTH] IN BLOOD BY AUTOMATED COUNT: 14.2 % (ref 11.5–17)
EST. AVERAGE GLUCOSE BLD GHB EST-MCNC: 116.9 MG/DL
GFR SERPLBLD CREATININE-BSD FMLA CKD-EPI: >60 ML/MIN/1.73/M2
GLOBULIN SER-MCNC: 3.8 GM/DL (ref 2.4–3.5)
GLUCOSE SERPL-MCNC: 106 MG/DL (ref 74–100)
HBA1C MFR BLD: 5.7 %
HCT VFR BLD AUTO: 44.8 % (ref 42–52)
HDLC SERPL-MCNC: 40 MG/DL (ref 35–60)
HGB BLD-MCNC: 14.3 G/DL (ref 14–18)
IMM GRANULOCYTES # BLD AUTO: 0.04 X10(3)/MCL (ref 0–0.04)
IMM GRANULOCYTES NFR BLD AUTO: 0.5 %
IRON SATN MFR SERPL: 26 % (ref 20–50)
IRON SERPL-MCNC: 69 UG/DL (ref 65–175)
LDLC SERPL CALC-MCNC: 49 MG/DL (ref 50–140)
LYMPHOCYTES # BLD AUTO: 3.41 X10(3)/MCL (ref 0.6–4.6)
LYMPHOCYTES NFR BLD AUTO: 39.3 %
MCH RBC QN AUTO: 26.4 PG (ref 27–31)
MCHC RBC AUTO-ENTMCNC: 31.9 G/DL (ref 33–36)
MCV RBC AUTO: 82.7 FL (ref 80–94)
MICROALBUMIN UR-MCNC: 14.4 UG/ML
MICROALBUMIN/CREAT RATIO PNL UR: 4.7 MG/GM CR (ref 0–30)
MONOCYTES # BLD AUTO: 0.59 X10(3)/MCL (ref 0.1–1.3)
MONOCYTES NFR BLD AUTO: 6.8 %
NEUTROPHILS # BLD AUTO: 4.28 X10(3)/MCL (ref 2.1–9.2)
NEUTROPHILS NFR BLD AUTO: 49.4 %
NRBC BLD AUTO-RTO: 0 %
PLATELET # BLD AUTO: 356 X10(3)/MCL (ref 130–400)
PMV BLD AUTO: 10.3 FL (ref 7.4–10.4)
POTASSIUM SERPL-SCNC: 4 MMOL/L (ref 3.5–5.1)
PROT SERPL-MCNC: 7.6 GM/DL (ref 6.4–8.3)
RBC # BLD AUTO: 5.42 X10(6)/MCL (ref 4.7–6.1)
SODIUM SERPL-SCNC: 141 MMOL/L (ref 136–145)
TIBC SERPL-MCNC: 197 UG/DL (ref 60–240)
TIBC SERPL-MCNC: 266 UG/DL (ref 250–450)
TRANSFERRIN SERPL-MCNC: 241 MG/DL (ref 174–364)
TRIGL SERPL-MCNC: 58 MG/DL (ref 34–140)
TSH SERPL-ACNC: 1.73 UIU/ML (ref 0.35–4.94)
VLDLC SERPL CALC-MCNC: 12 MG/DL
WBC # BLD AUTO: 8.67 X10(3)/MCL (ref 4.5–11.5)

## 2025-04-23 PROCEDURE — 80053 COMPREHEN METABOLIC PANEL: CPT

## 2025-04-23 PROCEDURE — 99214 OFFICE O/P EST MOD 30 MIN: CPT | Mod: PBBFAC

## 2025-04-23 PROCEDURE — 36415 COLL VENOUS BLD VENIPUNCTURE: CPT

## 2025-04-23 PROCEDURE — 84443 ASSAY THYROID STIM HORMONE: CPT

## 2025-04-23 PROCEDURE — 82043 UR ALBUMIN QUANTITATIVE: CPT

## 2025-04-23 PROCEDURE — 83550 IRON BINDING TEST: CPT

## 2025-04-23 PROCEDURE — 85025 COMPLETE CBC W/AUTO DIFF WBC: CPT

## 2025-04-23 PROCEDURE — 83036 HEMOGLOBIN GLYCOSYLATED A1C: CPT

## 2025-04-23 PROCEDURE — 80061 LIPID PANEL: CPT

## 2025-04-23 RX ORDER — BUDESONIDE AND FORMOTEROL FUMARATE DIHYDRATE 160; 4.5 UG/1; UG/1
2 AEROSOL RESPIRATORY (INHALATION) EVERY 12 HOURS
Qty: 10.2 G | Refills: 1 | Status: SHIPPED | OUTPATIENT
Start: 2025-04-23 | End: 2026-04-23

## 2025-04-23 RX ORDER — EZETIMIBE 10 MG/1
10 TABLET ORAL DAILY
Qty: 90 TABLET | Refills: 3 | Status: SHIPPED | OUTPATIENT
Start: 2025-04-23 | End: 2026-04-23

## 2025-04-23 RX ORDER — CETIRIZINE HYDROCHLORIDE 5 MG/1
5 TABLET ORAL DAILY
Qty: 30 TABLET | Refills: 11 | Status: SHIPPED | OUTPATIENT
Start: 2025-04-23 | End: 2026-04-23

## 2025-04-23 RX ORDER — DULOXETIN HYDROCHLORIDE 30 MG/1
30 CAPSULE, DELAYED RELEASE ORAL DAILY
Qty: 30 CAPSULE | Refills: 11 | Status: SHIPPED | OUTPATIENT
Start: 2025-04-23 | End: 2026-04-23

## 2025-04-23 RX ORDER — HYDROCODONE BITARTRATE AND ACETAMINOPHEN 5; 325 MG/1; MG/1
1 TABLET ORAL EVERY 6 HOURS PRN
COMMUNITY
Start: 2025-02-15

## 2025-04-23 RX ORDER — AMOXICILLIN AND CLAVULANATE POTASSIUM 500; 125 MG/1; MG/1
1 TABLET, FILM COATED ORAL 2 TIMES DAILY
COMMUNITY
Start: 2025-04-17 | End: 2025-04-23

## 2025-04-23 RX ORDER — PANTOPRAZOLE SODIUM 40 MG/1
40 TABLET, DELAYED RELEASE ORAL DAILY
Qty: 30 TABLET | Refills: 2 | Status: SHIPPED | OUTPATIENT
Start: 2025-04-23

## 2025-04-23 RX ORDER — IBUPROFEN 600 MG/1
600 TABLET ORAL 2 TIMES DAILY PRN
COMMUNITY
Start: 2025-03-10

## 2025-04-23 RX ORDER — PREGABALIN 75 MG/1
75 CAPSULE ORAL 3 TIMES DAILY
Qty: 90 CAPSULE | Refills: 6 | Status: SHIPPED | OUTPATIENT
Start: 2025-04-23 | End: 2025-11-19

## 2025-04-23 RX ORDER — METOPROLOL TARTRATE 25 MG/1
25 TABLET, FILM COATED ORAL 2 TIMES DAILY
Qty: 180 TABLET | Refills: 3 | Status: SHIPPED | OUTPATIENT
Start: 2025-04-23

## 2025-04-23 RX ORDER — AMLODIPINE BESYLATE 2.5 MG/1
2.5 TABLET ORAL DAILY
Qty: 90 TABLET | Refills: 3 | Status: SHIPPED | OUTPATIENT
Start: 2025-04-23

## 2025-04-23 RX ORDER — ASPIRIN 81 MG/1
81 TABLET ORAL DAILY
Qty: 30 TABLET | Refills: 4 | Status: SHIPPED | OUTPATIENT
Start: 2025-04-23

## 2025-04-23 RX ORDER — ROSUVASTATIN CALCIUM 40 MG/1
40 TABLET, COATED ORAL NIGHTLY
Qty: 90 TABLET | Refills: 3 | Status: SHIPPED | OUTPATIENT
Start: 2025-04-23

## 2025-04-23 NOTE — PROGRESS NOTES
INTERNAL MEDICINE [facility]IDENT [facility]INIC  CLINIC NOTE    Patient Name: John Jackson  YOB: 1975    PRESENTING HISTORY       History of Present Illness:  Mr. John Jackson is a 49 y.o. male  With PMHx: HTN, HLD, CVA (2015 w/ L sided weakness), PFO, chronic low back and neck pain, hx of gastric ulcer    This is a 46 y.o AAM who presents to clinic as a routine f/u. Denies any fever, chills, chest pain, palpitations, nausea, vomiting, abdominal pain, lower extremity swelling, weakness, dizziness, syncope Patient underwent C5-C7 anterior spine fusion by Dr. Aj on 3/17/21 and was scheduled to have L3-L4 fusion with Dr. Aj on 08/16/2021, but had to cancel due to his wife needing surgery.    Interval Hx: Reports difficulty swallowing solids, no difficulty swallowing liquids. He does endorse intermittent symptoms of pain with swallowing and states the he feels the sensation of food getting stuck in his throat but never vomits. He has also lost 6 ibs since Sept 2021 until now. He reports these symptoms all started a little before August 2021 but this is the first time patient has ever mentioned anything to me. Appetite has lessened some. He denies any abdominal pain, night sweats, fever/chills. He is taking omeprazole 20mg daily which has been on chronically. I ordered lab work for today's visit which he had done in [place] but does not have records sent here yet. Will try to obtain.     Was seen by Neurology, symptoms likely due to cervical myelopathy with carpopedal spasms and dystonic toes. Spasticity not due to stroke. PFO closure not necessary at this time, and was seen by Cardiology. LDL not at goal this time. Patient today complains of congestion, itchy throat, some mild SOB no wheezing. He states it has been going on for three days. Additionally still has dysphagia complaints, needs fluids to figueroa down his food otherwise solids comeback up. Not necessarily vomiting.    Flow Esophgram 1/2023  was negative.  Patient has been complaining of worsening headaches, as well as continued falls.  He states that his headaches begin either occipitally or in his right temporal area and spread associated with auras (visual) and lights. He states that it last for 1 to 3 days, totalling greater than 15 days in a month.     States he has shortness of breath that previously had been prevented with Symbicort. Headaches are still present however not a chief complaint. Lyrica doesn't help as much with pain as current dosage. Still has follow up for multiple specialties. See below in plan.    PFT done showing moderate obstruction with good response to bronchodilators. Otherwise has continued headaches. Neck Pain. HE has lost weight on his diet. BP is good. Advised to continue CPAP    10/7/2024: Patient minimal complaints. States he has had headaches. Advised on ibuprofen and tylenol as well as lyrica. Seen by NSGY and plan for PT with eventual possible intervention. Otherwise he complains of headaches >15 days in a month, lasting 3-5 min and can last 2 days.    4/23/2025: Patient not sleeping as well. He returned his CPAP. Otherwise he states he is a little more down. His SO is with him, as pleasant as ever. Will trial duloxetine. Advised on medication adherence.      CURRENT MEDICATIONS      Current Outpatient Medications on File Prior to Visit   Medication Sig    albuterol (VENTOLIN HFA) 90 mcg/actuation inhaler Inhale 2 puffs into the lungs every 6 (six) hours as needed for Wheezing. Rescue    HYDROcodone-acetaminophen (NORCO) 5-325 mg per tablet Take 1 tablet by mouth every 6 (six) hours as needed.    ibuprofen (ADVIL,MOTRIN) 600 MG tablet Take 600 mg by mouth 2 (two) times daily as needed.    nitroGLYCERIN (NITROSTAT) 0.4 MG SL tablet Place 1 tablet (0.4 mg total) under the tongue every 5 (five) minutes as needed for Chest pain.    ondansetron (ZOFRAN-ODT) 4 MG TbDL Take 1 tablet (4 mg total) by mouth every 24 hours as  needed (For nausea, when taking Repatha).    predniSONE (DELTASONE) 10 MG tablet Take 1 tablet (10 mg total) by mouth as needed (For SOB with Asthma).    [DISCONTINUED] amoxicillin-clavulanate 500-125mg (AUGMENTIN) 500-125 mg Tab Take 1 tablet by mouth 2 (two) times daily.    [DISCONTINUED] aspirin (ECOTRIN) 81 MG EC tablet Take 1 tablet (81 mg total) by mouth once daily.    [DISCONTINUED] budesonide-formoterol 160-4.5 mcg (SYMBICORT) 160-4.5 mcg/actuation HFAA Inhale 2 puffs into the lungs every 12 (twelve) hours. Controller    [DISCONTINUED] ezetimibe (ZETIA) 10 mg tablet Take 1 tablet (10 mg total) by mouth once daily.    [DISCONTINUED] metoprolol tartrate (LOPRESSOR) 25 MG tablet Take 1 tablet (25 mg total) by mouth 2 (two) times daily.    [DISCONTINUED] pantoprazole (PROTONIX) 40 MG tablet Take 1 tablet (40 mg total) by mouth once daily.    [DISCONTINUED] pregabalin (LYRICA) 75 MG capsule Take 1 capsule (75 mg total) by mouth 3 (three) times daily.    [DISCONTINUED] rosuvastatin (CRESTOR) 40 MG Tab Take 1 tablet (40 mg total) by mouth every evening.    evolocumab (REPATHA SURECLICK) 140 mg/mL PnIj Inject 1 mL (140 mg total) into the skin every 14 (fourteen) days. (Patient not taking: Reported on 4/23/2025)    NYSTOP powder Apply topically 2 (two) times daily. (Patient not taking: Reported on 4/23/2025)    [DISCONTINUED] amLODIPine (NORVASC) 2.5 MG tablet Take 1 tablet (2.5 mg total) by mouth once daily. (Patient not taking: Reported on 4/23/2025)    [DISCONTINUED] cetirizine (ZYRTEC) 5 MG tablet Take 1 tablet (5 mg total) by mouth once daily.    [DISCONTINUED] paroxetine (PAXIL) 30 MG tablet Take 1 tablet (30 mg total) by mouth once daily. (Patient not taking: Reported on 4/23/2025)     No current facility-administered medications on file prior to visit.         Review of Systems   HENT:  Negative for hearing loss.    Eyes:  Negative for discharge.   Respiratory:  Negative for wheezing.    Cardiovascular:   Negative for chest pain and palpitations.   Gastrointestinal:  Positive for constipation. Negative for blood in stool, diarrhea and vomiting.   Genitourinary:  Positive for urgency. Negative for hematuria.   Musculoskeletal:  Positive for neck pain.   Neurological:  Positive for weakness and headaches.   Endo/Heme/Allergies:  Positive for polydipsia.       PAST HISTORY:     Past Medical History:   Diagnosis Date    Diabetes mellitus, type 2     Hyperlipidemia     Hypertension        Past Surgical History:   Procedure Laterality Date    CERVICAL FUSION      [facility] [address] [place] 2021    CHOLECYSTECTOMY      FUSION OF CERVICAL SPINE BY POSTERIOR APPROACH  2017    [place]    SPINE SURGERY  March, 2021       Family History   Problem Relation Name Age of Onset    No Known Problems Mother      No Known Problems Father      Diabetes Paternal Grandfather Shad Jackson        Social History     Socioeconomic History    Marital status: Single   Occupational History    Occupation: dissabled   Tobacco Use    Smoking status: Never    Smokeless tobacco: Never   Substance and Sexual Activity    Alcohol use: Yes     Alcohol/week: 2.0 standard drinks of alcohol     Types: 2 Shots of liquor per week     Comment: glass per night (sm)    Drug use: Yes     Types: Marijuana     Comment: 3 puffs twice a day    Sexual activity: Not Currently     Partners: Female     Birth control/protection: None     Social Drivers of Health     Financial Resource Strain: Medium Risk (3/27/2024)    Overall Financial Resource Strain (CARDIA)     Difficulty of Paying Living Expenses: Somewhat hard   Food Insecurity: Food Insecurity Present (3/27/2024)    Hunger Vital Sign     Worried About Running Out of Food in the Last Year: Sometimes true     Ran Out of Food in the Last Year: Sometimes true   Transportation Needs: No Transportation Needs (3/27/2024)    PRAPARE - Transportation     Lack of Transportation (Medical): No     Lack of Transportation (Non-Medical):  "No   Physical Activity: Insufficiently Active (3/27/2024)    Exercise Vital Sign     Days of Exercise per Week: 3 days     Minutes of Exercise per Session: 10 min   Stress: No Stress Concern Present (3/27/2024)    [employer] [place] [employer] - Occupational Stress Questionnaire     Feeling of Stress : Only a little   Housing Stability: Low Risk  (3/27/2024)    Housing Stability Vital Sign     Unable to Pay for Housing in the Last Year: No     Number of Places Lived in the Last Year: 1     Unstable Housing in the Last Year: No       Review of patient's allergies indicates:   Allergen Reactions    Iodine Nausea And Vomiting     Seafood.    Shellfish containing products Shortness Of Breath       OBJECTIVE:   Vital Signs:  Vitals:    04/23/25 0746   BP: 123/85   Pulse: (!) 57   Resp: 20   Temp: 97.7 °F (36.5 °C)   TempSrc: Oral   SpO2: 99%   Weight: 121.3 kg (267 lb 6.7 oz)   Height: 5' 9" (1.753 m)         Recent Results (from the past 24 hours)   Hemoglobin A1C    Collection Time: 04/23/25  6:43 AM   Result Value Ref Range    Hemoglobin A1c 5.7 <=7.0 %    Estimated Average Glucose 116.9 mg/dL   Comprehensive Metabolic Panel    Collection Time: 04/23/25  6:43 AM   Result Value Ref Range    Sodium 141 136 - 145 mmol/L    Potassium 4.0 3.5 - 5.1 mmol/L    Chloride 108 (H) 98 - 107 mmol/L    CO2 26 22 - 29 mmol/L    Glucose 106 (H) 74 - 100 mg/dL    Blood Urea Nitrogen 8.9 8.9 - 20.6 mg/dL    Creatinine 1.02 0.72 - 1.25 mg/dL    Calcium 9.1 8.4 - 10.2 mg/dL    Protein Total 7.6 6.4 - 8.3 gm/dL    Albumin 3.8 3.5 - 5.0 g/dL    Globulin 3.8 (H) 2.4 - 3.5 gm/dL    Albumin/Globulin Ratio 1.0 (L) 1.1 - 2.0 ratio    Bilirubin Total 0.4 <=1.5 mg/dL    ALP 60 40 - 150 unit/L    ALT 40 0 - 55 unit/L    AST 27 11 - 45 unit/L    eGFR >60 mL/min/1.73/m2    Anion Gap 7.0 mEq/L    BUN/Creatinine Ratio 9    Iron and TIBC    Collection Time: 04/23/25  6:43 AM   Result Value Ref Range    Iron Binding Capacity Unsaturated 197 " 60 - 240 ug/dL    Iron Level 69 65 - 175 ug/dL    Transferrin 241 174 - 364 mg/dL    Iron Binding Capacity Total 266 250 - 450 ug/dL    Iron Saturation 26 20 - 50 %   Lipid Panel    Collection Time: 04/23/25  6:43 AM   Result Value Ref Range    Cholesterol Total 101 <=200 mg/dL    HDL Cholesterol 40 35 - 60 mg/dL    Triglyceride 58 34 - 140 mg/dL    Cholesterol/HDL Ratio 3 0 - 5    Very Low Density Lipoprotein 12     LDL Cholesterol 49.00 (L) 50.00 - 140.00 mg/dL   TSH    Collection Time: 04/23/25  6:43 AM   Result Value Ref Range    TSH 1.734 0.350 - 4.940 uIU/mL   CBC with Differential    Collection Time: 04/23/25  6:43 AM   Result Value Ref Range    WBC 8.67 4.50 - 11.50 x10(3)/mcL    RBC 5.42 4.70 - 6.10 x10(6)/mcL    Hgb 14.3 14.0 - 18.0 g/dL    Hct 44.8 42.0 - 52.0 %    MCV 82.7 80.0 - 94.0 fL    MCH 26.4 (L) 27.0 - 31.0 pg    MCHC 31.9 (L) 33.0 - 36.0 g/dL    RDW 14.2 11.5 - 17.0 %    Platelet 356 130 - 400 x10(3)/mcL    MPV 10.3 7.4 - 10.4 fL    Neut % 49.4 %    Lymph % 39.3 %    Mono % 6.8 %    Eos % 3.3 %    Basophil % 0.7 %    Imm Grans % 0.5 %    Neut # 4.28 2.1 - 9.2 x10(3)/mcL    Lymph # 3.41 0.6 - 4.6 x10(3)/mcL    Mono # 0.59 0.1 - 1.3 x10(3)/mcL    Eos # 0.29 0 - 0.9 x10(3)/mcL    Baso # 0.06 <=0.2 x10(3)/mcL    Imm Gran # 0.04 0.00 - 0.04 x10(3)/mcL    NRBC% 0.0 %   Microalbumin/Creatinine Ratio, Urine    Collection Time: 04/23/25  6:44 AM   Result Value Ref Range    Urine Microalbumin 14.4 <=30.0 ug/mL    Urine Creatinine 309.6 (H) 63.0 - 166.0 mg/dL    Microalbumin Creatinine Ratio 4.7 0.0 - 30.0 mg/gm Cr           Physical Exam  Constitutional:       Appearance: He is obese.   HENT:      Head: Normocephalic.      Nose: Congestion and rhinorrhea present.   Eyes:      General: Visual field deficit present.      Extraocular Movements: Extraocular movements intact.      Pupils: Pupils are equal, round, and reactive to light.   Cardiovascular:      Rate and Rhythm: Normal rate and regular rhythm.       Pulses: Normal pulses.      Heart sounds: Normal heart sounds.   Pulmonary:      Effort: Pulmonary effort is normal.      Breath sounds: Normal breath sounds.   Musculoskeletal:      Cervical back: Rigidity and tenderness present.   Neurological:      Mental Status: He is alert.      Cranial Nerves: Cranial nerve deficit present.      Motor: Weakness and abnormal muscle tone present. No tremor.      Coordination: Romberg sign positive. Finger-Nose-Finger Test abnormal.      Comments: Left Hand strength 3+/5 vs 5/5 RH    Movement Exam:    Hypophonic, Hypomimia  No postural or rest tremors  EOM slow, not   FTN without tremor  Hypokinesia in Bilateral Upper extremities on finger taps, decrement more apparent on right, left hypokinetic  Increased tone on left, spastic     Psychiatric:         Mood and Affect: Mood normal.         Behavior: Behavior normal.       Laboratory  CMP:   Recent Labs   Lab 12/04/23  0700 02/26/25  0827 04/23/25  0643   Sodium 143 141 141   Potassium 4.3 4.5 4.0   CO2 29 29 26   Blood Urea Nitrogen 13.7 11.6 8.9   Creatinine 1.18 0.96 1.02   Glucose 112 H 106 H 106 H   Calcium 9.4 8.9 9.1   Albumin 3.7 3.7 3.8   Bilirubin Total 0.4 0.4 0.4   AST 25 23 27   ALT 29 29 40   ALP 67 64 60     CBC:   Recent Labs   Lab 04/03/23  0904 12/04/23  0700 04/23/25  0643   WBC 8.5 9.25 8.67   Neut # 4.96 5.28 4.28   RBC 5.50 5.25 5.42   Hgb 14.3 14.0 14.3   Hct 45.2 43.5 44.8   Platelet 365 377 356   MCV 82.2 82.9 82.7   RDW 13.9 14.0 14.2     FLP:   Recent Labs   Lab 03/28/24  0906 02/26/25  0827 04/23/25  0643   Cholesterol Total 147 222 H 101   HDL Cholesterol 44 55 40   LDL Cholesterol 82.00 147.00 H 49.00 L   Triglyceride 104 98 58     DM:   Recent Labs   Lab 04/03/23  0904 12/04/23  0700 02/26/25  0827 04/23/25  0643 04/23/25  0644   Hemoglobin A1c 5.8 5.6  --  5.7  --    Urine Creatinine  --   --   --   --  309.6 H   Creatinine 1.27 H 1.18 0.96 1.02  --      Thyroid:   Recent Labs   Lab 12/04/23  0700  04/23/25  0643   TSH 1.346 1.734   Thyroxine Free 0.73  --      Anemia:   Recent Labs   Lab 12/04/23  0700 04/23/25  0643   Hgb 14.0 14.3   Hct 43.5 44.8   Iron Level  --  69   Ferritin Level 146.42  --    Iron Binding Capacity Total  --  266       ASSESSMENT & PLAN:         Health Maintenance Due   Topic Date Due    Pneumococcal Vaccines (Age 0-49) (2 of 2 - PCV) 12/09/2022    Influenza Vaccine (1) 09/01/2024    Colorectal Cancer Screening  06/26/2025        Cervical spine stenosis s/p C5-7 Cervical Fusion Anterior on 3/17/21  Migraines  - MRI lumbar spine in April 2015 showed diffuse spondylitic changes with mild diffuse narrowing of neural foramina, multilevel disc bulging  - Status post neck surgery in November 2015 at [facility]  - Most recently underwent C5-C7 spine fusion by Dr. Aj, scheduled to undergo L3-L4 fusion with Dr. Aj on 08/16/2021 which was cancelled and being rescheduled for next year, advised to schedule follow up  - Continue with increased dose of Lyrica  - Follow up with PT and NSGY, may eventually need intervention    Hx of PFO/ chronic chest pain on exertion  - PFO with right to left shunt seen on CHRIS in Sept 2015  - DSE was performed on 2/2017 which was negative for ischemia  - 48-Hour Holter Monitor (1/13/21): No significant arrhythmias found  - TTE (1/13/21): EF 55%, repeat pending  - DSE (1/13/21): Negative for ischemia  - Followed by [facility] cardiology last seen on 10/2023 with no plans for PFO closure at this time    Dysphagia -> Describes regurgitation of solid food if not follow by liquids  - Neurology note 11/2022 - cervical myelopathy with radiculopathy with residual dystonia and carpopedal spasm  - Barium Swallow 1/2023 with only mild reflux   - continue protonix 40mg daily    History of CVA with residual left-sided weakness, as reported  - Stable, no new deficits; uses walker and/or cane at home for ambulation.  - Would like to get MRI to quantify extent of lesions, will  hold off for now  - Continue aspirin 81mg daily  - LDL increased to now 160, currently on Zettia and Rosuvastatin max dose    Pre-diabetes  - A1C is 5.6% today  - Will continue metformin for now    Essential HTN, well-controlled  - BP today at goal  - Continue current medications Norvasc 2.5mg daily and metoprolol 25mg BID  - Low-sodium diet and exercise discussed    Depression, stable  - will continue home med paroxetine 30 mg daily    Asthma, Mild Persistent  -report hx of childhood asthma  -Prescribed symbicort 160-4.5m patient is compliant  -States he has 4x per month needs albuterol rescue inhaler, sometimes wakes him up at night  -PFTs done showed moderate obstruction with appr  -Keep prednisone 10 mg PRN 10 day supply    Toenail Fungus  - Referral to Podiatry made, He is having a procedure  - Patient is at low to moderate risk for a low risk procedure.  - He is without modifiable risk factors that could be performed prior to his surgery that would warrant delay.  - Will advise on topicals for now    JOEL  - Sleep study done, advised on continuing CPAP    Health Maintenance:  TDAP: 10/15/2020  CAGE screenin/  HIV screening: Nonreactive  Hepatitis and Syphilis Screen: Negative  Influenza vaccine: UTD  Tdap: given 10/15/2020  colon cancer screening: Done repeat 2025  AAA - never smoker  COVID - UTD  PCV 23 - Given 2022    RTC 6 months, advised on CPAP adherence, trial duloxetine      Future Appointments     Future Appointments   Date Time Provider Department Center   2025  2:30 PM Annel Pickett PA-C [facility] CARD [place]      No orders of the defined types were placed in this encounter.        Discussed with Dr. Covarrubias - Staff Attestation to Follow    Tu Ramirez MD  [facility] Internal Medicine PGY-3                          Answers submitted by the patient for this visit:  Review of Systems Questionnaire (Submitted on 2025)  activity change: No  unexpected weight change:  No  rhinorrhea: No  trouble swallowing: No  visual disturbance: No  chest tightness: No  polyuria: No  difficulty urinating: No  joint swelling: No  arthralgias: Yes  confusion: No  dysphoric mood: Yes

## 2025-04-24 NOTE — TELEPHONE ENCOUNTER
Contacted patient ID and  verified. This nurse confirmed patient's allergies and patient stated he is only allergic to Iodine and shellfish. Also stated he has been taking a baby aspirin for years with no problems. Placed called to [facility] and spoke with pharmacy [employer] to inform patient stated he DOES NOT have an allergy to Aspirin. [employer] verbalized complete understanding.

## 2025-05-09 ENCOUNTER — DOCUMENTATION ONLY (OUTPATIENT)
Dept: CARDIOLOGY | Facility: HOSPITAL | Age: 50
End: 2025-05-09
Payer: COMMERCIAL

## 2025-05-09 NOTE — PROGRESS NOTES
Preoperative CV risk assessment    Pt needs to undergo 2nd toe amputation on left foot under MAC.   Pt was last seen in clinic on 2/26/2025 and was doing well from a CV standpoint. Nursing staff reached out to pt who confirmed he continues to feel well from a CV standpoint.  RCRI 1 (hx of CVA). Pt is at low to moderate CV risk for a low risk procedure.  If needed aspirin can be held for 5-7 days prior to the procedure. Please resume once hemostasis is achieved.     Althea Nichole MD  Cardiology staff

## 2025-05-12 ENCOUNTER — LAB VISIT (OUTPATIENT)
Dept: LAB | Facility: HOSPITAL | Age: 50
End: 2025-05-12
Payer: COMMERCIAL

## 2025-05-12 DIAGNOSIS — B35.1 DERMATOPHYTOSIS OF NAIL: ICD-10-CM

## 2025-05-12 DIAGNOSIS — M79.675 PAIN IN TOE OF LEFT FOOT: ICD-10-CM

## 2025-05-12 DIAGNOSIS — M20.42 ACQUIRED HAMMER TOE DEFORMITY OF LESSER TOE OF LEFT FOOT: Primary | ICD-10-CM

## 2025-05-12 DIAGNOSIS — Z01.818 PREOP EXAMINATION: ICD-10-CM

## 2025-05-12 DIAGNOSIS — M20.42 ACQUIRED HAMMER TOE DEFORMITY OF LESSER TOE OF LEFT FOOT: ICD-10-CM

## 2025-05-12 DIAGNOSIS — M79.674 PAIN IN TOE OF RIGHT FOOT: ICD-10-CM

## 2025-05-12 LAB
ANION GAP SERPL CALC-SCNC: 10 MEQ/L
BASOPHILS # BLD AUTO: 0.06 X10(3)/MCL
BASOPHILS NFR BLD AUTO: 0.6 %
BUN SERPL-MCNC: 12.1 MG/DL (ref 8.9–20.6)
CALCIUM SERPL-MCNC: 9.3 MG/DL (ref 8.4–10.2)
CHLORIDE SERPL-SCNC: 107 MMOL/L (ref 98–107)
CO2 SERPL-SCNC: 26 MMOL/L (ref 22–29)
CREAT SERPL-MCNC: 1.01 MG/DL (ref 0.72–1.25)
CREAT/UREA NIT SERPL: 12
EOSINOPHIL # BLD AUTO: 0.22 X10(3)/MCL (ref 0–0.9)
EOSINOPHIL NFR BLD AUTO: 2.3 %
ERYTHROCYTE [DISTWIDTH] IN BLOOD BY AUTOMATED COUNT: 14.4 % (ref 11.5–17)
GFR SERPLBLD CREATININE-BSD FMLA CKD-EPI: >60 ML/MIN/1.73/M2
GLUCOSE SERPL-MCNC: 101 MG/DL (ref 74–100)
HCT VFR BLD AUTO: 46.6 % (ref 42–52)
HGB BLD-MCNC: 14.3 G/DL (ref 14–18)
IMM GRANULOCYTES # BLD AUTO: 0.03 X10(3)/MCL (ref 0–0.04)
IMM GRANULOCYTES NFR BLD AUTO: 0.3 %
LYMPHOCYTES # BLD AUTO: 2.76 X10(3)/MCL (ref 0.6–4.6)
LYMPHOCYTES NFR BLD AUTO: 28.8 %
MCH RBC QN AUTO: 26 PG (ref 27–31)
MCHC RBC AUTO-ENTMCNC: 30.7 G/DL (ref 33–36)
MCV RBC AUTO: 84.7 FL (ref 80–94)
MONOCYTES # BLD AUTO: 0.6 X10(3)/MCL (ref 0.1–1.3)
MONOCYTES NFR BLD AUTO: 6.3 %
NEUTROPHILS # BLD AUTO: 5.93 X10(3)/MCL (ref 2.1–9.2)
NEUTROPHILS NFR BLD AUTO: 61.7 %
NRBC BLD AUTO-RTO: 0 %
OHS QRS DURATION: 88 MS
OHS QTC CALCULATION: 428 MS
PLATELET # BLD AUTO: 377 X10(3)/MCL (ref 130–400)
PMV BLD AUTO: 10.7 FL (ref 7.4–10.4)
POTASSIUM SERPL-SCNC: 5 MMOL/L (ref 3.5–5.1)
RBC # BLD AUTO: 5.5 X10(6)/MCL (ref 4.7–6.1)
SODIUM SERPL-SCNC: 143 MMOL/L (ref 136–145)
WBC # BLD AUTO: 9.6 X10(3)/MCL (ref 4.5–11.5)

## 2025-05-12 PROCEDURE — 93010 ELECTROCARDIOGRAM REPORT: CPT | Mod: ,,, | Performed by: STUDENT IN AN ORGANIZED HEALTH CARE EDUCATION/TRAINING PROGRAM

## 2025-05-12 PROCEDURE — 93005 ELECTROCARDIOGRAM TRACING: CPT

## 2025-05-12 PROCEDURE — 36415 COLL VENOUS BLD VENIPUNCTURE: CPT

## 2025-05-12 PROCEDURE — 85025 COMPLETE CBC W/AUTO DIFF WBC: CPT

## 2025-05-12 PROCEDURE — 80048 BASIC METABOLIC PNL TOTAL CA: CPT

## 2025-05-14 ENCOUNTER — ANESTHESIA (OUTPATIENT)
Dept: SURGERY | Facility: HOSPITAL | Age: 50
End: 2025-05-14
Payer: COMMERCIAL

## 2025-05-14 ENCOUNTER — HOSPITAL ENCOUNTER (OUTPATIENT)
Facility: HOSPITAL | Age: 50
Discharge: HOME | End: 2025-05-14
Payer: COMMERCIAL

## 2025-05-14 ENCOUNTER — ANESTHESIA EVENT (OUTPATIENT)
Dept: SURGERY | Facility: HOSPITAL | Age: 50
End: 2025-05-14
Payer: COMMERCIAL

## 2025-05-14 VITALS
HEART RATE: 87 BPM | SYSTOLIC BLOOD PRESSURE: 114 MMHG | HEIGHT: 69 IN | OXYGEN SATURATION: 98 % | RESPIRATION RATE: 15 BRPM | DIASTOLIC BLOOD PRESSURE: 66 MMHG | TEMPERATURE: 97 F | BODY MASS INDEX: 39.61 KG/M2 | WEIGHT: 267.44 LBS

## 2025-05-14 DIAGNOSIS — M20.42 HAMMER TOE OF LEFT FOOT: ICD-10-CM

## 2025-05-14 DIAGNOSIS — Z01.818 PREOP EXAMINATION: Primary | ICD-10-CM

## 2025-05-14 DIAGNOSIS — M79.675 PAIN IN TOE OF LEFT FOOT: ICD-10-CM

## 2025-05-14 LAB — POCT GLUCOSE: 107 MG/DL (ref 70–110)

## 2025-05-14 PROCEDURE — 37000008 HC ANESTHESIA 1ST 15 MINUTES

## 2025-05-14 PROCEDURE — 37000009 HC ANESTHESIA EA ADD 15 MINS

## 2025-05-14 PROCEDURE — 27201423 OPTIME MED/SURG SUP & DEVICES STERILE SUPPLY

## 2025-05-14 PROCEDURE — 63600175 PHARM REV CODE 636 W HCPCS

## 2025-05-14 PROCEDURE — 36000706

## 2025-05-14 PROCEDURE — 36000707

## 2025-05-14 PROCEDURE — 71000016 HC POSTOP RECOV ADDL HR

## 2025-05-14 PROCEDURE — 63600175 PHARM REV CODE 636 W HCPCS: Performed by: NURSE ANESTHETIST, CERTIFIED REGISTERED

## 2025-05-14 PROCEDURE — 71000015 HC POSTOP RECOV 1ST HR

## 2025-05-14 RX ORDER — PROPOFOL 10 MG/ML
VIAL (ML) INTRAVENOUS CONTINUOUS PRN
Status: DISCONTINUED | OUTPATIENT
Start: 2025-05-14 | End: 2025-05-14

## 2025-05-14 RX ORDER — ONDANSETRON HYDROCHLORIDE 2 MG/ML
INJECTION, SOLUTION INTRAVENOUS
Status: DISCONTINUED | OUTPATIENT
Start: 2025-05-14 | End: 2025-05-14

## 2025-05-14 RX ORDER — DIPHENHYDRAMINE HYDROCHLORIDE 50 MG/ML
25 INJECTION, SOLUTION INTRAMUSCULAR; INTRAVENOUS ONCE
OUTPATIENT
Start: 2025-05-14 | End: 2025-05-14

## 2025-05-14 RX ORDER — ONDANSETRON HYDROCHLORIDE 2 MG/ML
4 INJECTION, SOLUTION INTRAVENOUS DAILY PRN
OUTPATIENT
Start: 2025-05-14

## 2025-05-14 RX ORDER — MEPERIDINE HYDROCHLORIDE 25 MG/ML
12.5 INJECTION INTRAMUSCULAR; INTRAVENOUS; SUBCUTANEOUS ONCE AS NEEDED
Refills: 0 | OUTPATIENT
Start: 2025-05-14 | End: 2025-05-15

## 2025-05-14 RX ORDER — BUPIVACAINE HYDROCHLORIDE 5 MG/ML
INJECTION, SOLUTION EPIDURAL; INTRACAUDAL; PERINEURAL
Status: DISCONTINUED
Start: 2025-05-14 | End: 2025-05-14 | Stop reason: HOSPADM

## 2025-05-14 RX ORDER — LIDOCAINE HYDROCHLORIDE 10 MG/ML
INJECTION, SOLUTION INFILTRATION; PERINEURAL
Status: DISCONTINUED | OUTPATIENT
Start: 2025-05-14 | End: 2025-05-14 | Stop reason: HOSPADM

## 2025-05-14 RX ORDER — HYDROCODONE BITARTRATE AND ACETAMINOPHEN 5; 325 MG/1; MG/1
1 TABLET ORAL EVERY 4 HOURS PRN
Refills: 0 | Status: DISCONTINUED | OUTPATIENT
Start: 2025-05-14 | End: 2025-05-14 | Stop reason: HOSPADM

## 2025-05-14 RX ORDER — MIDAZOLAM HYDROCHLORIDE 2 MG/2ML
2 INJECTION, SOLUTION INTRAMUSCULAR; INTRAVENOUS ONCE AS NEEDED
Status: COMPLETED | OUTPATIENT
Start: 2025-05-14 | End: 2025-05-14

## 2025-05-14 RX ORDER — SODIUM CHLORIDE 9 MG/ML
INJECTION, SOLUTION INTRAVENOUS CONTINUOUS
Status: DISCONTINUED | OUTPATIENT
Start: 2025-05-14 | End: 2025-05-14 | Stop reason: HOSPADM

## 2025-05-14 RX ORDER — LIDOCAINE HYDROCHLORIDE 10 MG/ML
INJECTION, SOLUTION INFILTRATION; PERINEURAL
Status: DISCONTINUED
Start: 2025-05-14 | End: 2025-05-14 | Stop reason: HOSPADM

## 2025-05-14 RX ORDER — GLYCOPYRROLATE 0.2 MG/ML
INJECTION INTRAMUSCULAR; INTRAVENOUS
Status: DISCONTINUED | OUTPATIENT
Start: 2025-05-14 | End: 2025-05-14

## 2025-05-14 RX ORDER — HYDROMORPHONE HYDROCHLORIDE 2 MG/ML
0.4 INJECTION, SOLUTION INTRAMUSCULAR; INTRAVENOUS; SUBCUTANEOUS EVERY 5 MIN PRN
Refills: 0 | OUTPATIENT
Start: 2025-05-14

## 2025-05-14 RX ORDER — CEFAZOLIN SODIUM 1 G/3ML
2 INJECTION, POWDER, FOR SOLUTION INTRAMUSCULAR; INTRAVENOUS
Status: COMPLETED | OUTPATIENT
Start: 2025-05-14 | End: 2025-05-14

## 2025-05-14 RX ORDER — FENTANYL CITRATE 50 UG/ML
INJECTION, SOLUTION INTRAMUSCULAR; INTRAVENOUS
Status: DISCONTINUED | OUTPATIENT
Start: 2025-05-14 | End: 2025-05-14

## 2025-05-14 RX ORDER — MIDAZOLAM HYDROCHLORIDE 2 MG/2ML
INJECTION, SOLUTION INTRAMUSCULAR; INTRAVENOUS
Status: COMPLETED
Start: 2025-05-14 | End: 2025-05-14

## 2025-05-14 RX ORDER — SODIUM CHLORIDE, SODIUM LACTATE, POTASSIUM CHLORIDE, CALCIUM CHLORIDE 600; 310; 30; 20 MG/100ML; MG/100ML; MG/100ML; MG/100ML
INJECTION, SOLUTION INTRAVENOUS CONTINUOUS
Status: DISCONTINUED | OUTPATIENT
Start: 2025-05-14 | End: 2025-05-14 | Stop reason: HOSPADM

## 2025-05-14 RX ORDER — HYDROCODONE BITARTRATE AND ACETAMINOPHEN 10; 325 MG/1; MG/1
1 TABLET ORAL EVERY 4 HOURS PRN
Refills: 0 | Status: DISCONTINUED | OUTPATIENT
Start: 2025-05-14 | End: 2025-05-14 | Stop reason: HOSPADM

## 2025-05-14 RX ORDER — PROCHLORPERAZINE EDISYLATE 5 MG/ML
5 INJECTION INTRAMUSCULAR; INTRAVENOUS EVERY 30 MIN PRN
OUTPATIENT
Start: 2025-05-14

## 2025-05-14 RX ORDER — BUPIVACAINE HYDROCHLORIDE 5 MG/ML
INJECTION, SOLUTION EPIDURAL; INTRACAUDAL; PERINEURAL
Status: DISCONTINUED | OUTPATIENT
Start: 2025-05-14 | End: 2025-05-14 | Stop reason: HOSPADM

## 2025-05-14 RX ORDER — LIDOCAINE HYDROCHLORIDE 20 MG/ML
INJECTION, SOLUTION EPIDURAL; INFILTRATION; INTRACAUDAL; PERINEURAL
Status: DISCONTINUED | OUTPATIENT
Start: 2025-05-14 | End: 2025-05-14

## 2025-05-14 RX ORDER — SODIUM CHLORIDE, SODIUM GLUCONATE, SODIUM ACETATE, POTASSIUM CHLORIDE AND MAGNESIUM CHLORIDE 30; 37; 368; 526; 502 MG/100ML; MG/100ML; MG/100ML; MG/100ML; MG/100ML
INJECTION, SOLUTION INTRAVENOUS CONTINUOUS
Status: DISCONTINUED | OUTPATIENT
Start: 2025-05-14 | End: 2025-05-14 | Stop reason: HOSPADM

## 2025-05-14 RX ADMIN — MIDAZOLAM HYDROCHLORIDE 2 MG: 1 INJECTION, SOLUTION INTRAMUSCULAR; INTRAVENOUS at 07:05

## 2025-05-14 RX ADMIN — MIDAZOLAM HYDROCHLORIDE 2 MG: 2 INJECTION, SOLUTION INTRAMUSCULAR; INTRAVENOUS at 07:05

## 2025-05-14 RX ADMIN — CEFAZOLIN 2 G: 330 INJECTION, POWDER, FOR SOLUTION INTRAMUSCULAR; INTRAVENOUS at 07:05

## 2025-05-14 RX ADMIN — GLYCOPYRROLATE 0.2 MG: 0.2 INJECTION INTRAMUSCULAR; INTRAVENOUS at 07:05

## 2025-05-14 RX ADMIN — LIDOCAINE HYDROCHLORIDE 50 MG: 20 INJECTION, SOLUTION INTRAVENOUS at 07:05

## 2025-05-14 RX ADMIN — ONDANSETRON 4 MG: 2 INJECTION INTRAMUSCULAR; INTRAVENOUS at 07:05

## 2025-05-14 RX ADMIN — FENTANYL CITRATE 100 MCG: 50 INJECTION, SOLUTION INTRAMUSCULAR; INTRAVENOUS at 07:05

## 2025-05-14 RX ADMIN — PROPOFOL 125 MCG/KG/MIN: 10 INJECTION, EMULSION INTRAVENOUS at 07:05

## 2025-05-14 NOTE — OP NOTE
[facility] Surgical - Periop Services  Operative Note      Date of Procedure: 5/14/2025     Procedure: Procedure(s) (LRB):  AMPUTATION, TOE / Left 2nd digit partial // mac / surgical shoe (Left)     Surgeons and Role:     * Ashli Ashton DPM - Primary    Assisting Surgeon: None    Pre-Operative Diagnosis: Hammer toe of left foot [M20.42]  Pain in toe of left foot [M79.675]    Post-Operative Diagnosis: Same    Anesthesia: Monitor Anesthesia Care    Operative Findings (including complications, if any):  Long 2nd toe with mild plantarflexion at the distal and proximal interphalangeal joints.    Description of Technical Procedures:        Patient was brought in the operating room and placed on the operating room table in the supine position.  After the administration of IV sedation and antibiotics, a well-padded pneumatic tourniquet was placed on the left ankle.  20 mL of a 1:1 mixture of 0.5% Marcaine plain and 1% lidocaine plain was injected into the 2nd digit.  The left foot was prepped and draped aseptically.  After exsanguination with an Esmarch, the tourniquet was inflated to 250 mmHg.      A fishmouth incision was placed at the 2nd distal interphalangeal joint.  The distal phalanx was disarticulated and excised with the distal digit.  Using the saw, the head of the middle phalanx was resected.  After flushing with copious amounts of sterile saline, layered closure was performed with Monocryl and nylon.  The digit was noted to be slightly shorter than the hallux and 3rd digit.  The incision   was dressed with Adaptic, gauze, Magnolia, Kerlix, and Coban.  The tourniquet was released at 22 minutes.      Patient tolerated the procedures well.  He was brought into recovery with vital signs stable and neurovascular status intact to all digits on the left foot.      Significant Surgical Tasks Conducted by the Assistant(s), if Applicable: None    Estimated Blood Loss (EBL): * No values recorded between 5/14/2025   "7:19 AM and 5/14/2025  8:11 AM *           Implants: * No implants in log *    Specimens:   Specimen (24h ago, onward)      None           * No specimens in log *           Condition: Good    Disposition: PACU - hemodynamically stable.    Attestation: I performed the procedure.    Discharge Note    OUTCOME: Patient tolerated treatment/procedure well without complication and is now ready for discharge.    DISPOSITION: Home or Self Care    FINAL DIAGNOSIS:  Left painful second hammertoe     FOLLOWUP: In clinic    DISCHARGE INSTRUCTIONS:    Discharge Procedure Orders   POST-SURGICAL BOOT/SHOE FOR HOME USE   Order Comments: Surgical shoe     Order Specific Question Answer Comments   Height: 5' 9" (1.753 m)    Weight: 121.3 kg (267 lb 6.7 oz)    Quantity: 1    Size: L    Length of need (1-99 months): 1      Diet general     Sponge bath only until clinic visit     Keep surgical extremity elevated     Ice to affected area     Leave dressing on - Keep it clean, dry, and intact until clinic visit     EKG 12-lead   Standing Status: Future Number of Occurrences: 1 Standing Exp. Date: 05/08/26     Activity as tolerated       "

## 2025-05-14 NOTE — ANESTHESIA POSTPROCEDURE EVALUATION
Anesthesia Post Evaluation    Patient: John Jackson    Procedure(s) Performed: Procedure(s) (LRB):  AMPUTATION, TOE / Left 2nd digit partial // mac / surgical shoe (Left)    Final Anesthesia Type: general      Patient location during evaluation: OPS  Patient participation: Yes- Able to Participate  Level of consciousness: awake and alert  Post-procedure vital signs: reviewed and stable  Pain management: adequate  Airway patency: patent  JOEL mitigation strategies: Multimodal analgesia  PONV status at discharge: No PONV  Anesthetic complications: no                    Vitals Value Taken Time   /66 05/14/25 08:05   Temp 36.3 °C (97.3 °F) 05/14/25 08:05   Pulse 87 05/14/25 08:05   Resp 15 05/14/25 08:05   SpO2 98 % 05/14/25 08:05         No case tracking events are documented in the log.      Pain/Mary Score: No data recorded

## 2025-05-14 NOTE — DISCHARGE SUMMARY
"[place] Surgical - Periop Services  Discharge Note  Short Stay    Procedure(s) (LRB):  AMPUTATION, TOE / Left 2nd digit partial // mac / surgical shoe (Left)      OUTCOME: Patient tolerated treatment/procedure well without complication and is now ready for discharge.    DISPOSITION: Home or Self Care    FINAL DIAGNOSIS:  Left painful second hammertoe    FOLLOWUP: In clinic    DISCHARGE INSTRUCTIONS:    Discharge Procedure Orders   POST-SURGICAL BOOT/SHOE FOR HOME USE   Order Comments: Surgical shoe     Order Specific Question Answer Comments   Height: 5' 9" (1.753 m)    Weight: 121.3 kg (267 lb 6.7 oz)    Quantity: 1    Size: L    Length of need (1-99 months): 1      Diet general     Sponge bath only until clinic visit     Keep surgical extremity elevated     Ice to affected area     Leave dressing on - Keep it clean, dry, and intact until clinic visit     EKG 12-lead   Standing Status: Future Number of Occurrences: 1 Standing Exp. Date: 05/08/26     Activity as tolerated        TIME SPENT ON DISCHARGE: 10 minutes  "

## 2025-08-07 ENCOUNTER — OFFICE VISIT (OUTPATIENT)
Dept: CARDIOLOGY | Facility: CLINIC | Age: 50
End: 2025-08-07
Payer: COMMERCIAL

## 2025-08-07 VITALS
WEIGHT: 276.19 LBS | RESPIRATION RATE: 18 BRPM | SYSTOLIC BLOOD PRESSURE: 135 MMHG | OXYGEN SATURATION: 97 % | HEART RATE: 70 BPM | HEIGHT: 69 IN | TEMPERATURE: 98 F | DIASTOLIC BLOOD PRESSURE: 89 MMHG | BODY MASS INDEX: 40.91 KG/M2

## 2025-08-07 DIAGNOSIS — I10 PRIMARY HYPERTENSION: ICD-10-CM

## 2025-08-07 DIAGNOSIS — R06.02 SOB (SHORTNESS OF BREATH): ICD-10-CM

## 2025-08-07 DIAGNOSIS — I34.0 MITRAL VALVE INSUFFICIENCY, UNSPECIFIED ETIOLOGY: ICD-10-CM

## 2025-08-07 DIAGNOSIS — R00.2 PALPITATIONS: Primary | ICD-10-CM

## 2025-08-07 DIAGNOSIS — E78.2 MIXED HYPERLIPIDEMIA: ICD-10-CM

## 2025-08-07 DIAGNOSIS — E78.5 HYPERLIPIDEMIA LDL GOAL <70: ICD-10-CM

## 2025-08-07 PROCEDURE — 3075F SYST BP GE 130 - 139MM HG: CPT | Mod: CPTII,,,

## 2025-08-07 PROCEDURE — 3061F NEG MICROALBUMINURIA REV: CPT | Mod: CPTII,,,

## 2025-08-07 PROCEDURE — 3008F BODY MASS INDEX DOCD: CPT | Mod: CPTII,,,

## 2025-08-07 PROCEDURE — 3079F DIAST BP 80-89 MM HG: CPT | Mod: CPTII,,,

## 2025-08-07 PROCEDURE — 1159F MED LIST DOCD IN RCRD: CPT | Mod: CPTII,,,

## 2025-08-07 PROCEDURE — 3066F NEPHROPATHY DOC TX: CPT | Mod: CPTII,,,

## 2025-08-07 PROCEDURE — 99214 OFFICE O/P EST MOD 30 MIN: CPT | Mod: S$PBB,,,

## 2025-08-07 PROCEDURE — 1160F RVW MEDS BY RX/DR IN RCRD: CPT | Mod: CPTII,,,

## 2025-08-07 PROCEDURE — 99215 OFFICE O/P EST HI 40 MIN: CPT | Mod: PBBFAC

## 2025-08-07 PROCEDURE — 3044F HG A1C LEVEL LT 7.0%: CPT | Mod: CPTII,,,

## 2025-08-07 NOTE — PATIENT INSTRUCTIONS
Follow up in cardiology clinic in 4-6 months or sooner if needed   Follow up with PCP as directed   Please notify clinic if any new concerns or any change in symptoms

## 2025-08-07 NOTE — PROGRESS NOTES
CHIEF COMPLAINT:   No chief complaint on file.                                                 HPI:  John Jackson 49 y.o. male with a PMH significant for HTN, HLD, diet controlled DM, ?CVA x 2 (no brain MRI at the time), PFO, asthma who presents to cardiology clinic for follow up and results of testing. He reported having a coronary angiogram with Dr. Nate Mae in the past in which he was told he did not have any significant blockage. Patient completed a 48-hour Holter monitor in January 2021 in which no significant arrhythmias were found. Had a 30-day event monitor in October 2022 that did not show any arrhythmias. He completed an echocardiogram in January 2021 which revealed an ejection fraction of 55%. Dobutamine stress echocardiogram completed in January 2021 was negative for ischemia. Echo and Nuclear Stress completed April 2023. Echo revealed EF 60%, mild [place], mild MR. Nuclear stress test revealed a normal myocardial perfusion scan, no evidence of ischemia or infarction, EF 59% at rest and EF 61% post stress.     The patient states that he feels stable from a cardiac standpoint.  He continues to have SOB/LEW that has stable and has not worsened since his last office visit.  He states that he takes breaks as needed whenever he is doing physical activity.  He continues to have some dizziness that occurs every now and then.  He has occasional palpitations but states that they are not very bothersome.  He states that they typically occur when he is resting or lying down.  He returned his CPAP and has no interested in restarting.  He denies any other symptoms such as chest pain lightheadedness, dizzy, syncope, lower extremity edema, claudication symptoms, PND, orthopnea.  He states that he has bilateral lower extremity weakness that he associates with his chronic back issues.  He states that he is actually had a few falls due to the leg weakness.  He reports compliance with all his current medications  that he states that he is tolerating them well.  He tries to follow a heart healthy diet.  He states that he rides his bike and walks for exercise.  He reports compliance with all his current medications and he states that he is tolerating them well.  He states that he is working hard to lose weight and has been trying to follow a strict diet and exercise routine.                                                                                                                                                                     CARDIAC TESTING:  Nuclear Stress - Cardiology Interpreted 4.26.23    Normal myocardial perfusion scan. There is no evidence of myocardial ischemia or infarction.    The gated perfusion images showed an ejection fraction of 59% at rest. The gated perfusion images showed an ejection fraction of 61% post stress.    The patient reported no chest pain during the stress test.    There were no arrhythmias during stress.  On the nuclear stress test the EF is normal.  If the patient has an echo, the EF on the echo is considered more accurate.  The patient has a low risk nuclear stress test.    Echo 3.27.23  · Normal right ventricular size with normal right ventricular systolic function.  · Mild pulmonic regurgitation.  · Mild mitral regurgitation.  · The left ventricle is normal in size with normal systolic function.  · The estimated ejection fraction is 60%.  · Normal left ventricular diastolic function.  · There is no pulmonary hypertension.    Patient Active Problem List   Diagnosis    Fungal toenail infection    Palpitations    Hypertension    Hyperlipidemia LDL goal <70    Cervical myelopathy with cervical radiculopathy    HLD (hyperlipidemia)    SOB (shortness of breath)    Hammer toe of left foot    Pain in toe of left foot     Past Surgical History:   Procedure Laterality Date    CARDIAC CATHETERIZATION      CERVICAL FUSION      [facility] [address] 2021    CHOLECYSTECTOMY      COLONOSCOPY      FUSION  OF CERVICAL SPINE BY POSTERIOR APPROACH  2017    [place]    SPINE SURGERY  March, 2021    TOE AMPUTATION Left 5/14/2025    Procedure: AMPUTATION, TOE / Left 2nd digit partial // mac / surgical shoe;  Surgeon: Ashli Ashton DPM;  Location: [facility];  Service: Podiatry;  Laterality: Left;    WISDOM TOOTH EXTRACTION       Social History     Socioeconomic History    Marital status:    Occupational History    Occupation: dissabled   Tobacco Use    Smoking status: Never    Smokeless tobacco: Never   Substance and Sexual Activity    Alcohol use: Yes     Alcohol/week: 6.0 standard drinks of alcohol     Types: 3 Glasses of wine, 3 Shots of liquor per week     Comment: glass per night (sm)    Drug use: Yes     Types: Marijuana     Comment: 3 puffs twice a day    Sexual activity: Not Currently     Partners: Female     Birth control/protection: None     Social Drivers of Health     Financial Resource Strain: Medium Risk (3/27/2024)    Overall Financial Resource Strain (CARDIA)     Difficulty of Paying Living Expenses: Somewhat hard   Food Insecurity: Food Insecurity Present (3/27/2024)    Hunger Vital Sign     Worried About Running Out of Food in the Last Year: Sometimes true     Ran Out of Food in the Last Year: Sometimes true   Transportation Needs: No Transportation Needs (3/27/2024)    PRAPARE - Transportation     Lack of Transportation (Medical): No     Lack of Transportation (Non-Medical): No   Physical Activity: Insufficiently Active (3/27/2024)    Exercise Vital Sign     Days of Exercise per Week: 3 days     Minutes of Exercise per Session: 10 min   Stress: No Stress Concern Present (3/27/2024)    [employer] - Occupational Stress Questionnaire     Feeling of Stress : Only a little   Housing Stability: Low Risk  (3/27/2024)    Housing Stability Vital Sign     Unable to Pay for Housing in the Last Year: No     Number of Places Lived in the Last Year: 1     Unstable Housing in the  Last Year: No        Family History   Problem Relation Name Age of Onset    No Known Problems Mother      No Known Problems Father      Diabetes Paternal Grandfather Shad Jackson      Review of patient's allergies indicates:   Allergen Reactions    Iodine Nausea And Vomiting     Seafood.    Shellfish containing products Shortness Of Breath         ROS:  Review of Systems   Constitutional: Negative.    HENT: Negative.     Eyes: Negative.    Respiratory:  Positive for shortness of breath (With over exertion). Negative for sputum production.    Cardiovascular:  Positive for palpitations (Occasional at night) and leg swelling (Occasional). Negative for chest pain, orthopnea, claudication and PND.   Gastrointestinal: Negative.  Negative for nausea and vomiting.   Genitourinary: Negative.    Musculoskeletal:  Positive for back pain, falls (Mechanical falls) and joint pain.   Skin: Negative.    Neurological: Negative.    Endo/Heme/Allergies: Negative.    Psychiatric/Behavioral: Negative.     All other systems reviewed and are negative.                                                                                                                                                                               Negative except as stated in the history of present illness. See HPI for details.    PHYSICAL EXAM:  There were no vitals taken for this visit.        Physical Exam  Vitals reviewed.   Constitutional:       Appearance: Normal appearance. He is obese. He is not ill-appearing.   HENT:      Head: Normocephalic.      Nose: Nose normal.      Mouth/Throat:      Mouth: Mucous membranes are moist.   Eyes:      Extraocular Movements: Extraocular movements intact.   Cardiovascular:      Rate and Rhythm: Normal rate and regular rhythm.      Pulses: Normal pulses.      Heart sounds: Normal heart sounds. No murmur heard.  Pulmonary:      Effort: Pulmonary effort is normal. No respiratory distress.      Breath sounds: Normal breath  sounds.   Abdominal:      Palpations: Abdomen is soft.   Musculoskeletal:      Cervical back: Normal range of motion.      Right lower leg: No edema.      Left lower leg: No edema.   Skin:     General: Skin is warm and dry.   Neurological:      Mental Status: He is alert and oriented to person, place, and time.         Current Outpatient Medications   Medication Instructions    albuterol (VENTOLIN HFA) 90 mcg/actuation inhaler 2 puffs, Inhalation, Every 6 hours PRN, Rescue    amLODIPine (NORVASC) 2.5 mg, Oral, Daily    aspirin (ECOTRIN) 81 mg, Oral, Daily    budesonide-formoterol 160-4.5 mcg (SYMBICORT) 160-4.5 mcg/actuation HFAA 2 puffs, Inhalation, Every 12 hours, Controller    cetirizine (ZYRTEC) 5 mg, Oral, Daily    DULoxetine (CYMBALTA) 30 mg, Oral, Daily    ezetimibe (ZETIA) 10 mg, Oral, Daily    HYDROcodone-acetaminophen (NORCO) 5-325 mg per tablet 1 tablet, Every 6 hours PRN    ibuprofen (ADVIL,MOTRIN) 600 mg, 2 times daily PRN    metoprolol tartrate (LOPRESSOR) 25 mg, Oral, 2 times daily    nitroGLYCERIN (NITROSTAT) 0.4 mg, Sublingual, Every 5 min PRN    NYSTOP powder 2 times daily    ondansetron (ZOFRAN-ODT) 4 mg, Oral, Every 24 hours as needed    pantoprazole (PROTONIX) 40 mg, Oral, Daily    predniSONE (DELTASONE) 10 mg, Oral, As needed (PRN)    pregabalin (LYRICA) 75 mg, Oral, 3 times daily    rosuvastatin (CRESTOR) 40 mg, Oral, Nightly        All medications, laboratory studies, cardiac diagnostic imaging reviewed.     Lab Results   Component Value Date    TRIG 58 04/23/2025    TRIG 98 02/26/2025    CREATININE 1.01 05/12/2025    MG 1.97 01/13/2021    K 5.0 05/12/2025        ASSESSMENT/PLAN:    SOB/LEW- Unchanged from Prior   Mild MR, Mild  [place]   - Stable from last visit- he states that he has symptoms with his typical activities, but it is not limiting   - No progression of symptoms. Occur at rest and with exertion  - Echo and stress test as above  - No indication for further testing at this time  - Will  repeat Echo to assess  valve status    Edema  - No recent episodes of edema -see HPI- no complaints   - Continue with conservative measures with compression stockings and elevation of legs when possible.  - Echo with EF 60%, mild CO/MR, normal RV size and function, normal LV diastolic function (3.27.23)  - Nuclear Stress Test revealed normal myocardial perfusion scan, no evidence of ischemia or infarction, rest EF 59%, post stress EF 61%, no chest pain during test, no arrhythmias during stress test (4.26.23)  - Likely non-cardiac, may consider venous insufficiency US in future if symptoms persist, though no evidence of edema on exam today   - Recommend further evaluation by PCP     HTN   - BP at goal  135/89  - Continue Norvasc   - Counseled on low sodium diet and exercise as tolerated     HLD   - LDL 49 per labs April 2025   - Continue Crestor and Zetia  - Continue Repatha and is tolerating well   - Counseled on heart healthy, low cholesterol diet and exercise as tolerated      Left-Sided Spacticity/Weakness  - Stable. Also reports ongoing chronic neck and back pain - see HPI - no change in symptoms   - Concern for possible stroke in 2015 b/c of left sided weakness but MRI Brain was never performed. Pt found to have PFO on CHRIS in 2015, so there was a question as to whether he would benefit from PFO closure. Case discussed with Neurology who clarified that patient's left-sided symptoms are due to spasticity from a cervical cord compression in 2015 and is not due to a stroke. So we will not refer him for PFO closure.   - Continue Aspirin, Statin  - Management per PCP/Neurology     DM   - Diet controlled. A1c 5.7% per labs April 2025   - Management per PCP      JOEL  - Confirmed on sleep study  - Patient states that he was unable to tolerate the CPAP mask.  He ultimately ended up returning the CPAP to [facility].  Encouraged him to reach back out to [facility] and/or sleep clinic to see if he is a candidate for an  alternative mask- he is not interested at this time   - Strongly encouraged compliance with CPAP and again educated on the detrimental effects of untreated sleep apnea    Obesity  - Counseled on the importance of weight loss through healthy diet and exercise         Follow up in cardiology clinic in 4-6 months or sooner if needed   Follow up with PCP as directed   Please notify clinic if any new concerns or any change in symptoms

## 2025-08-19 ENCOUNTER — HOSPITAL ENCOUNTER (OUTPATIENT)
Dept: CARDIOLOGY | Facility: HOSPITAL | Age: 50
Discharge: HOME | End: 2025-08-19
Payer: COMMERCIAL

## 2025-08-19 DIAGNOSIS — I34.0 MITRAL VALVE INSUFFICIENCY, UNSPECIFIED ETIOLOGY: ICD-10-CM

## 2025-08-19 DIAGNOSIS — R06.02 SOB (SHORTNESS OF BREATH): ICD-10-CM

## 2025-08-19 LAB
APICAL FOUR CHAMBER EJECTION FRACTION: 55 %
AV INDEX (PROSTH): 0.59
AV MEAN GRADIENT: 6 MMHG
AV PEAK GRADIENT: 10 MMHG
AV VALVE AREA BY VELOCITY RATIO: 2.9 CM²
AV VALVE AREA: 2.4 CM²
AV VELOCITY RATIO: 0.69
CV ECHO LV RWT: 0.42 CM
DOP CALC AO PEAK VEL: 1.6 M/S
DOP CALC AO VTI: 31.2 CM
DOP CALC LVOT AREA: 4.2 CM2
DOP CALC LVOT DIAMETER: 2.3 CM
DOP CALC LVOT PEAK VEL: 1.1 M/S
DOP CALC MV VTI: 31.4 CM
DOP CALCLVOT PEAK VEL VTI: 18.3 CM
E WAVE DECELERATION TIME: 211 MSEC
E/A RATIO: 1.15
E/E' RATIO: 6 M/S
ECHO LV POSTERIOR WALL: 1.1 CM (ref 0.6–1.1)
EJECTION FRACTION: 55 %
FRACTIONAL SHORTENING: 39.6 % (ref 28–44)
HR MV ECHO: 75 BPM
INTERVENTRICULAR SEPTUM: 1.2 CM (ref 0.6–1.1)
LEFT ATRIUM AREA SYSTOLIC (APICAL 4 CHAMBER): 11.1 CM2
LEFT ATRIUM SIZE: 3.5 CM
LEFT INTERNAL DIMENSION IN SYSTOLE: 3.2 CM (ref 2.1–4)
LEFT VENTRICLE DIASTOLIC VOLUME: 135 ML
LEFT VENTRICLE END DIASTOLIC VOLUME APICAL 4 CHAMBER: 80.9 ML
LEFT VENTRICLE END SYSTOLIC VOLUME APICAL 4 CHAMBER: 23 ML
LEFT VENTRICLE SYSTOLIC VOLUME: 41 ML
LEFT VENTRICULAR INTERNAL DIMENSION IN DIASTOLE: 5.3 CM (ref 3.5–6)
LEFT VENTRICULAR MASS: 242 G
LV LATERAL E/E' RATIO: 4.9 M/S
LV SEPTAL E/E' RATIO: 6.3 M/S
LVED V (TEICH): 135 ML
LVES V (TEICH): 41 ML
LVOT MG: 2 MMHG
LVOT MV: 0.65 CM/S
MV MEAN GRADIENT: 2 MMHG
MV PEAK A VEL: 0.6 M/S
MV PEAK E VEL: 0.69 M/S
MV PEAK GRADIENT: 5 MMHG
MV STENOSIS PRESSURE HALF TIME: 64 MS
MV VALVE AREA BY CONTINUITY EQUATION: 2.42 CM2
MV VALVE AREA P 1/2 METHOD: 3.44 CM2
PISA TR MAX VEL: 2.2 M/S
PV PEAK GRADIENT: 3 MMHG
PV PEAK VELOCITY: 0.93 M/S
RA PRESSURE ESTIMATED: 3 MMHG
RV TB RVSP: 5 MMHG
TDI LATERAL: 0.14 M/S
TDI SEPTAL: 0.11 M/S
TDI: 0.13 M/S
TR MAX PG: 19 MMHG
TRICUSPID ANNULAR PLANE SYSTOLIC EXCURSION: 1.9 CM
TV REST PULMONARY ARTERY PRESSURE: 22 MMHG

## 2025-08-19 PROCEDURE — 93306 TTE W/DOPPLER COMPLETE: CPT

## 2025-08-19 PROCEDURE — 93306 TTE W/DOPPLER COMPLETE: CPT | Mod: 26,,, | Performed by: INTERNAL MEDICINE

## (undated) DEVICE — GLOVE SENSICARE PI MICRO 6.5

## (undated) DEVICE — BNDG COFLEX FOAM LF2 ST 3X5YD

## (undated) DEVICE — PACK ELITE MINIVIEW DRAPE

## (undated) DEVICE — BLADE AGGR TOOTH MED 25X9

## (undated) DEVICE — PACK  BASIC ORTHO LAFAYETTE

## (undated) DEVICE — TOURNIQUET DB QC DP 18X5.5IN

## (undated) DEVICE — NDL HYPO REG 25G X 1 1/2

## (undated) DEVICE — GLOVE SIGNATURE MICRO LTX 6.5

## (undated) DEVICE — NDL SYR 10ML 18X1.5 LL BLUNT

## (undated) DEVICE — SHEET COMFORTGLIDE STD 40X80IN

## (undated) DEVICE — BLADE SURG STAINLESS STEEL #15

## (undated) DEVICE — DRAPE EXTREMITY ORTHOMAX

## (undated) DEVICE — DRESSING N ADH OIL EMUL 3X3

## (undated) DEVICE — GLOVE SENSICARE PI GRN 6

## (undated) DEVICE — SOL NACL IRR 1000ML BTL